# Patient Record
(demographics unavailable — no encounter records)

---

## 2018-11-03 NOTE — EDPHYS
Physician Documentation                                                                           

 [facility]                                                                

Name: Lesley Jones                                                                             

Age: 30 yrs                                                                                       

Sex: Female                                                                                       

: 1987                                                                                   

MRN: [id number]                                                                                   

Arrival Date: 2018                                                                          

Time: 05:23                                                                                       

Account#: [id number]                                                                            

Bed 17                                                                                            

Private MD: [facility]                                                                         

ED Physician Pedro Fay                                                                       

HPI:                                                                                              

                                                                                             

06:12 This 30 yrs old  Female presents to ER via Ambulatory with complaints of Chest rh1 

      Pain.                                                                                       

06:12 The patient or guardian reports chest pain that is located primarily in the anterior    rh1 

      aspect of left upper chest. The pain radiates to the left shoulder. Associated signs        

      and symptoms: Pertinent positives: cough, lightheadedness, near-syncope, shortness of       

      breath, Pertinent negatives: abdominal pain, diaphoresis, lower extremity pain, lower       

      extremity swelling, nausea, palpitations, recent travel, syncope. The chest pain is         

      described as sharp. Duration: The patient or guardian reports multiple episodes, that       

      are intermittent, that wax and wane, the episodes last approximately 1 second(s).           

      Modifying factors: The symptoms are alleviated by nothing. the symptoms are aggravated      

      by deep breath. Severity of pain: At its worst the pain was moderate in the emergency       

      department the pain has resolved. The patient has not experienced similar symptoms in       

      the past. The patient has not recently seen a physician. Reports left upper chest pain      

      that began while at work 1 hour ago, moving through left shoulder. + SOB with onset of      

      pain and continues at this time. Reports pain lasts for < 1 min at a time, and she          

      becomes lightheaded with episodes. Last episode approx. 10 min ago. Denies any              

      diaphoresis, N/V, syncope, leg pain/swelling, no recent surgery/travel..                    

                                                                                                  

OB/GYN:                                                                                           

05:37 LMP 10/18/2018                                                                          bb  

                                                                                                  

Historical:                                                                                       

- Allergies:                                                                                      

05:37 Sudafed (RAPID HEART RATE);                                                             bb  

05:37 Tramadol HCl (Unable to sleep);                                                         bb  

- Home Meds:                                                                                      

05:37 Adderall 10 mg Oral tab 1 tab once daily [Active]; levothyroxine 25 mcg oral tab 1 tab  bb  

      once daily [Active]; Prozac 40 mg Oral cap 1 cap 2 times per day [Active]; propranolol      

      20 mg Oral tab 1 tab 2 times per day [Active];                                              

- PMHx:                                                                                           

05:37 ADD/ADHD; Hypothyroidism; Essential tremors; Anxiety; Depression;                       bb  

- PSHx:                                                                                           

05:37 ; Cholecystectomy; Lap band;                                                   bb  

                                                                                                  

- Immunization history:: Adult Immunizations up to date.                                          

- Social history:: Smoking status: Patient uses tobacco products, smokes one-half pack            

  cigarettes per day, Patient/guardian denies using alcohol, street drugs.                        

- Ebola Screening: : No symptoms or risks identified at this time.                                

- Family history:: not pertinent.                                                                 

                                                                                                  

                                                                                                  

ROS:                                                                                              

06:12 Constitutional: Negative for fever, chills                                              rh1 

06:12 ENT: Positive for nasal discharge, Negative for difficulty swallowing, difficulty           

      handling secretions.                                                                        

06:12 Cardiovascular: Positive for chest pain, Negative for edema, palpitations.                  

06:12 Respiratory: Positive for cough, with no reported sputum, shortness of breath, Negative     

      for hemoptysis, sputum production, wheezing.                                                

06:12 Abdomen/GI: Negative for abdominal pain, nausea, vomiting, and diarrhea.                    

06:12 Back: Negative for decreased range of motion, pain at rest, pain with movement,             

      radiated pain.                                                                              

06:12 MS/extremity: Positive for pain, from left chest, Negative for decreased range of           

      motion, paresthesias.                                                                       

06:12 Skin: Negative for diaphoresis.                                                             

06:12 Neuro: Positive for dizziness, near syncope, Negative for altered mental status,            

      headache, numbness, syncope, tingling, weakness.                                            

06:12 All other systems are negative.                                                             

                                                                                                  

Exam:                                                                                             

06:12 Constitutional:  This is a well developed, well nourished patient who is awake, alert,  rh1 

      and in no acute distress. Head/Face:  Normocephalic, atraumatic. ENT:  Nares patent. No     

      nasal discharge, no septal abnormalities noted.  Tympanic membranes are normal and          

      external auditory canals are clear.  Oropharynx with no redness, swelling, or masses,       

      exudates, or evidence of obstruction, uvula midline.  Mucous membranes moist. Neck:         

      Trachea midline, and no cervical lymphadenopathy.  Supple, full range of motion without     

      nuchal rigidity.  No Meningismus. Cardiovascular:  Regular rate and rhythm with a           

      normal S1 and S2.  No gallops, murmurs, or rubs.  No JVD.  No pulse deficits.               

      Respiratory:  Lungs have equal breath sounds bilaterally, clear to auscultation.  No        

      rales, rhonchi or wheezes noted.  No increased work of breathing. Abdomen/GI:  Soft,        

      non-tender, with normal bowel sounds.  No distension.  No guarding or rebound.  No          

      evidence of tenderness throughout. Back:  No spinal tenderness.  No costovertebral          

      tenderness.  Full range of motion.                                                          

06:12 Skin:  Warm, dry with normal turgor.  Normal color with no rashes, no lesions, and no       

      evidence of cellulitis. MS/ Extremity:  Pulses equal, no cyanosis.  Neurovascular           

      intact.  Full, normal range of motion.                                                      

06:12 Cardiovascular: Edema: is not appreciated.                                                  

06:12 Musculoskeletal/extremity: DVT Exam: No signs of deep vein thrombosis. no pain, no          

      swelling, no tenderness, negative Homans' sign noted on exam, no appreciated bluish         

      discoloration, no erythema, no increased warmth, Calves: are non-tender, have equal         

      circumference.                                                                              

06:12 Neuro: Orientation: is normal, to person, place \T\ time. Mentation: is normal, lucid,      

      able to follow commands, Motor: is normal, moves all fours, Sensation: is normal, no        

      obvious gross deficits.                                                                     

                                                                                                  

Vital Signs:                                                                                      

05:37  / 87; Pulse 85; Resp 16 S; Temp 98.7(O); Pulse Ox 100% on R/A; Weight 97.52 kg   bb  

      (R); Height 5 ft. 2 in. (157.48 cm) (R); Pain 6/10;                                         

07:05  / 65; Pulse 79; Resp 19; Pulse Ox 100% on R/A; Pain 2/10;                        em  

08:00  / 72; Pulse 77; Resp 18; Pulse Ox 99% on R/A; Pain 2/10;                         em  

05:37 Body Mass Index 39.32 (97.52 kg, 157.48 cm)                                             bb  

                                                                                                  

MDM:                                                                                              

06:12 Patient medically screened.                                                             rh1 

06:40 ED course: PERC = 0.                                                                    rh1 

07:57 The patient's deep vein thrombosis risk score was calculated as follows: Total Score:   rh1 

      0. This patient was found to be at low risk for a deep vein thrombosis by using the         

      Well's assessment criteria. Data reviewed: vital signs, nurses notes, lab test              

      result(s), EKG, radiologic studies, plain films, and as a result, I will discharge          

      patient. Data interpreted: Pulse oximetry: on room air is 100 %. Interpretation:            

      normal. Counseling: I had a detailed discussion with the patient and/or guardian            

      regarding: the historical points, exam findings, and any diagnostic results supporting      

      the discharge/admit diagnosis, lab results, radiology results, the need for outpatient      

      follow up, a family practitioner, to return to the emergency department if symptoms         

      worsen or persist or if there are any questions or concerns that arise at home. ED          

      course: Without pain at this time, VSS, labs unremarkable.                                  

07:57 Special discussion: Based on the patient's history, exam, and Dx evaluation, there is   rh1 

      no indication for emergent intervention or inpatient Tx. It is understood by the            

      patient/guardian that if the Sx's persist or worsen they need to return immediately for     

      re-evaluation.                                                                              

                                                                                                  

                                                                                             

06:10 Order name: Urine Dipstick--Ancillary (enter results)                                   ms  

                                                                                             

06:10 Order name: Urine Pregnancy--Ancillary (enter results)                                  ms  

                                                                                             

06:33 Order name: Basic Metabolic Panel; Complete Time: 07:41                                 [facility] 

                                                                                             

06:33 Order name: CBC with Diff; Complete Time: 07:29                                         [facility] 

                                                                                             

06:33 Order name: LFT's; Complete Time: 07:41                                                 rh 

                                                                                             

06:33 Order name: Magnesium; Complete Time: 07:41                                             rh 

                                                                                             

06:33 Order name: NT PRO-BNP; Complete Time: 07:41                                            rh 

                                                                                             

06:33 Order name: PT-INR; Complete Time: 07:29                                                rh1 

                                                                                             

06:33 Order name: Troponin (emerg Dept Use Only); Complete Time: 07:41                        [facility] 

                                                                                             

06:33 Order name: XRAY Chest (1 view)                                                         [facility] 

                                                                                             

06:33 Order name: TSH; Complete Time: 07:41                                                   [facility] 

                                                                                             

06:33 Order name: Cardiac monitoring; Complete Time: 06:35                                    rh1 

                                                                                             

06:33 Order name: EKG - Nurse/Tech; Complete Time: 06:35                                      rh1 

                                                                                             

06:33 Order name: IV Saline Lock; Complete Time: 06:35                                        rh 

                                                                                             

06:33 Order name: Labs collected and sent; Complete Time: 06:35                               1 

                                                                                             

06:33 Order name: O2 Per Protocol; Complete Time: 06:35                                       [facility] 

                                                                                             

06:33 Order name: O2 Sat Monitoring; Complete Time: 06:35                                     rh1 

                                                                                                  

Administered Medications:                                                                         

No medications were administered                                                                  

                                                                                                  

                                                                                                  

Disposition:                                                                                      

08:19 Co-signature as Attending Physician, Pedro Fay MD I agree with the assessment and   kdr 

      plan of care.                                                                               

                                                                                                  

Disposition:                                                                                      

18 07:59 Discharged to Home. Impression: Chest pain on breathing.                           

- Condition is Stable.                                                                            

- Discharge Instructions: Nonspecific Chest Pain, Chest Wall Pain, Pleurisy.                      

                                                                                                  

- Medication Reconciliation Form, Thank You Letter, Antibiotic Education, Prescription            

  Opioid Use form.                                                                                

- Follow up: Lio Laguna DO; When: 1 - 2 days; Reason: Recheck today's complaints,             

  Continuance of care, Re-evaluation by your physician. Follow up: Emergency                      

  Department; When: As needed; Reason: Fever > 102 F, If symptoms return, Trouble                 

  breathing, Worsening of condition.                                                              

- Problem is new.                                                                                 

- Symptoms have improved.                                                                         

                                                                                                  

                                                                                                  

                                                                                                  

Signatures:                                                                                       

Dispatcher [employer]                           [facility]                                                 

Pedro Fay MD MD   kdr                                                  

Chapo Story, LVN                       LVN  em                                                   

Allie Lau, RN                     RN   Lenora Calles, NP                       NP   rh1                                                  

                                                                                                  

Corrections: (The following items were deleted from the chart)                                    

06:11 06:11 Urine Dipstick-Ancillary ordered. [facility]

06:11 06:11 Urine Pregnancy--Ancillary ordered. [facility]

08:14 07:59 2018 07:59 Discharged to Home. Impression: Chest pain on breathing.         em  

      Condition is Stable. Forms are Medication Reconciliation Form, Thank You Letter,            

      Antibiotic Education, Prescription Opioid Use. Follow up: Lio Laguna; When: 1 - 2         

      days; Reason: Recheck today's complaints, Continuance of care, Re-evaluation by your        

      physician. Follow up: Emergency Department; When: As needed; Reason: Fever > 102 F, If      

      symptoms return, Trouble breathing, Worsening of condition. Problem is new. Symptoms        

      have improved. rh1                                                                          

                                                                                                  

**************************************************************************************************

## 2018-11-03 NOTE — XMS REPORT
[facility]

 Created on:August 3, 2018



Patient:Lesley Jones

Sex:Female

:1987

External Reference #:[id number]





Demographics







 Address  [address] [place] [id number]

 

 Phone  [phone]

 

 Preferred Language  en

 

 Marital Status  Unknown

 

 Congregation Affiliation  Unknown

 

 Race  

 

 Ethnic Group  Unknown









Author







 Organization  [employer]









Care Team Providers







 Name  Role  Phone

 

 Jase Lio  Provider Role  Unavailable









Allergies

No Known Allergies



Problems







 Problem Type  Condition  Code  Onset Dates  Condition Status

 

 Problem  Hypothyroidism, unspecified type  E03.9    Active

 

 Problem  Seasonal allergic rhinitis,  J30.2    Active



   unspecified trigger      

 

 Assessment  Hypothyroidism, unspecified type  E03.9    Active

 

 Problem  Neck pain  M54.2    Active

 

 Problem  PTSD (post-traumatic stress  F43.10    Active



   disorder)      

 

 Problem  Otalgia of both ears  H92.03    Active

 

 Problem  Primary insomnia  F51.01    Active

 

 Problem  Gastroesophageal reflux disease  K21.9    Active



   without esophagitis      

 

 Problem  Depression with anxiety  F41.8    Active

 

 Problem  ADHD (attention deficit  F90.0    Active



   hyperactivity disorder), inattentive      



   type      







Medications







 Medication  Code  Code  Instructions  Start  End  Status  Dosage



   System      Date  Date    

 

 Levothyroxine  NDC  [id number]  25 MCG Orally      Active  1 tablet



 Sodium      Once a day        on an



               empty



               stomach in



               the



               morning







Results

No Known Results



Summary Purpose

[employer] Submission

## 2018-11-03 NOTE — XMS REPORT
[facility]

 Created on:2018



Patient:Lesley Campa

Sex:Female

:1987

External Reference #:[id number]





Demographics







 Address  [address] [place] [address]

 

 Phone  [phone]

 

 Preferred Language  en

 

 Marital Status  Unknown

 

 Judaism Affiliation  Unknown

 

 Race  

 

 Ethnic Group   or 









Author







 Organization  [employer]









Care Team Providers







 Name  Role  Phone

 

 Lio Laguna  Provider Role  Unavailable









Allergies, Adverse Reactions, Alerts







 Substance  Reaction  Event Type

 

 tramadol  more anxious/no sleep  Drug Allergy

 

 Topamax  mouth break out  Drug Allergy

 

 Sudafed  makes heart race  Drug Allergy







Problems







 Problem Type  Condition  Code  Onset Dates  Condition Status

 

 Assessment  ADHD (attention deficit  F90.0    Active



   hyperactivity disorder), inattentive      



   type      

 

 Problem  Hypothyroidism, unspecified type  E03.9    Active

 

 Problem  Seasonal allergic rhinitis,  J30.2    Active



   unspecified trigger      

 

 Problem  Neck pain  M54.2    Active

 

 Problem  PTSD (post-traumatic stress  F43.10    Active



   disorder)      

 

 Problem  Otalgia of both ears  H92.03    Active

 

 Problem  Primary insomnia  F51.01    Active

 

 Problem  Gastroesophageal reflux disease  K21.9    Active



   without esophagitis      

 

 Problem  Depression with anxiety  F41.8    Active

 

 Problem  ADHD (attention deficit  F90.0    Active



   hyperactivity disorder), inattentive      



   type      

 

 Assessment  Otalgia of both ears  H92.03    Active

 

 Assessment  PTSD (post-traumatic stress  F43.10    Active



   disorder)      

 

 Assessment  Depression with anxiety  F41.8    Active

 

 Assessment  Neck pain  M54.2    Active

 

 Assessment  Hypothyroidism, unspecified type  E03.9    Active

 

 Assessment  Seasonal allergic rhinitis,  J30.2    Active



   unspecified trigger      

 

 Assessment  Primary insomnia  F51.01    Active







Medications







 Medication  Code  Code  Instructions  Start  End  Status  Dosage



   System      Date  Date    

 

 Prozac  NDC  29963253193  40 MG Orally      Active  1 capsule



       Twice a day        in the



               morning

 

 Levothyroxine  NDC  [id number]  25 MCG Orally      Active  1 tablet



 Sodium      Once a day        on an



               empty



               stomach in



               the



               morning

 

 Amoxicillin  NDC  82914410062  500 MG Orally    Active  1 capsule



       every 8 hrs  2018    

 

 Ambien  NDC  34861691236  10 MG Orally      Active  1 tablet



       Once a day        at bedtime



               as needed

 

 Adderall  NDC  45810924165  10 MG Orally      Active  1 tablet



       Twice a day        in the



               morning

 

 Zantac  NDC  74147556302  150 MG Orally      Active  1 tablet



       Once a day        at bedtime

 

 Duexis  NDC  08944477301  800-26.6 MG      Active  1 tablet



       Orally Three        



       times a day PRN        



       pain        







Results

No Known Results



Summary Purpose

eClinicalWorks Submission

## 2018-11-03 NOTE — XMS REPORT
[facility]

 Created on:April 10, 2018



Patient:Lesley Campa

Sex:Female

:1987

External Reference #:[id number]





Demographics







 Address  [address] [place] [address]

 

 Phone  [phone]

 

 Preferred Language  en

 

 Marital Status  Unknown

 

 Gnosticism Affiliation  Unknown

 

 Race  

 

 Ethnic Group   or 









Author







 Organization  [employer]









Care Team Providers







 Name  Role  Phone

 

 Lio Laguna  Provider Role  Unavailable









Allergies

No Known Allergies



Problems







 Problem Type  Condition  Code  Onset Dates  Condition Status

 

 Problem  Seasonal allergic rhinitis,  J30.2    Active



   unspecified trigger      

 

 Problem  PTSD (post-traumatic stress  F43.10    Active



   disorder)      

 

 Problem  Depression with anxiety  F41.8    Active

 

 Problem  Neck pain  M54.2    Active

 

 Problem  Gastroesophageal reflux disease  K21.9    Active



   without esophagitis      

 

 Problem  Hypothyroidism, unspecified type  E03.9    Active

 

 Problem  ADHD (attention deficit  F90.0    Active



   hyperactivity disorder), inattentive      



   type      

 

 Problem  Primary insomnia  F51.01    Active







Medications

No Known Medications



Results

No Known Results



Summary Purpose

[employer] Submission

## 2018-11-03 NOTE — XMS REPORT
[facility]

 Created on:2018



Patient:Lesley Jones

Sex:Female

:1987

External Reference #:[id number]





Demographics







 Address  [address] [place] [address]

 

 Phone  [phone]

 

 Preferred Language  en

 

 Marital Status  Unknown

 

 Catholic Affiliation  Unknown

 

 Race  

 

 Ethnic Group  Unknown









Author







 Organization  [employer]









Care Team Providers







 Name  Role  Phone

 

 Lio Laguna  Provider Role  Unavailable









Allergies

No Known Allergies



Problems







 Problem Type  Condition  Code  Onset Dates  Condition Status

 

 Problem  Hypothyroidism, unspecified type  E03.9    Active

 

 Problem  Seasonal allergic rhinitis,  J30.2    Active



   unspecified trigger      

 

 Problem  Neck pain  M54.2    Active

 

 Problem  PTSD (post-traumatic stress  F43.10    Active



   disorder)      

 

 Problem  Otalgia of both ears  H92.03    Active

 

 Problem  Primary insomnia  F51.01    Active

 

 Problem  Gastroesophageal reflux disease  K21.9    Active



   without esophagitis      

 

 Problem  Depression with anxiety  F41.8    Active

 

 Problem  ADHD (attention deficit  F90.0    Active



   hyperactivity disorder), inattentive      



   type      

 

 Assessment  Otalgia of both ears  H92.03    Active

 

 Assessment  Neck pain  M54.2    Active

 

 Assessment  Depression with anxiety  F41.8    Active

 

 Assessment  Hypothyroidism, unspecified type  E03.9    Active

 

 Assessment  Seasonal allergic rhinitis,  J30.2    Active



   unspecified trigger      

 

 Assessment  Primary insomnia  F51.01    Active

 

 Assessment  PTSD (post-traumatic stress  F43.10    Active



   disorder)      

 

 Assessment  ADHD (attention deficit  F90.0    Active



   hyperactivity disorder), inattentive      



   type      







Medications







 Medication  Code  Code  Instructions  Start  End  Status  Dosage



   System      Date  Date    

 

 Belsomra  NDC  32503006923  10 MG Orally  ,    Active  1 tablet



       Once a day        at



               bedtime



               as needed

 

 Adderall  NDC  12414175057  10 MG Orally      Active  1 tablet



       Twice a day        in the



               morning

 

 Zantac  NDC  19262440492  150 MG Orally      Active  1 tablet



       Once a day        at



               bedtime

 

 Prozac  NDC  20067076842  40 MG Orally      Active  1 capsule



       Twice a day        in the



               morning

 

 Levothyroxine  NDC  40509581483  25 MCG Orally      Active  1 tablet



 Sodium      Once a day        on an



               empty



               stomach



               in the



               morning

 

 Duexis  NDC  98383967420  800-26.6 MG      Active  1 tablet



       Orally Three        



       times a day PRN        



       pain        

 

 Ambien  NDC  21628934444  10 MG Orally      Inactive  1 tablet



       Once a day        at



               bedtime



               as needed







Results

No Known Results



Summary Purpose

[employer]ClinicalWorks Submission

## 2018-11-03 NOTE — XMS REPORT
[facility]

 Created on:2018



Patient:Lesley Campa

Sex:Female

:1987

External Reference #:[id number]





Demographics







 Address  [address] [place] [address]

 

 Phone  [phone]

 

 Preferred Language  en

 

 Marital Status  Unknown

 

 Pentecostalism Affiliation  Unknown

 

 Race  

 

 Ethnic Group   or 









Author







 Organization  [employer]









Care Team Providers







 Name  Role  Phone

 

 Lio Laguna  Provider Role  Unavailable









Allergies

No Known Allergies



Problems







 Problem Type  Condition  Code  Onset Dates  Condition Status

 

 Problem  Seasonal allergic rhinitis,  J30.2    Active



   unspecified trigger      

 

 Problem  PTSD (post-traumatic stress  F43.10    Active



   disorder)      

 

 Problem  Depression with anxiety  F41.8    Active

 

 Problem  Neck pain  M54.2    Active

 

 Problem  Gastroesophageal reflux disease  K21.9    Active



   without esophagitis      

 

 Problem  Hypothyroidism, unspecified type  E03.9    Active

 

 Problem  ADHD (attention deficit  F90.0    Active



   hyperactivity disorder), inattentive      



   type      

 

 Problem  Primary insomnia  F51.01    Active







Medications

No Known Medications



Results

No Known Results



Summary Purpose

[employer] Submission

## 2018-11-03 NOTE — XMS REPORT
[facility]

 Created on:August 15, 2018



Patient:Lesley Jones

Sex:Female

:1987

External Reference #:[id number]





Demographics







 Address  [address] [place] [address]

 

 Phone  [phone]

 

 Preferred Language  en

 

 Marital Status  Unknown

 

 Synagogue Affiliation  Unknown

 

 Race  

 

 Ethnic Group  Unknown









Author







 Organization  [employer]









Care Team Providers







 Name  Role  Phone

 

 Jase Lio  Provider Role  Unavailable









Allergies

No Known Allergies



Problems







 Problem Type  Condition  Code  Onset Dates  Condition Status

 

 Problem  Hypothyroidism, unspecified type  E03.9    Active

 

 Problem  Seasonal allergic rhinitis,  J30.2    Active



   unspecified trigger      

 

 Problem  Neck pain  M54.2    Active

 

 Problem  PTSD (post-traumatic stress  F43.10    Active



   disorder)      

 

 Problem  Otalgia of both ears  H92.03    Active

 

 Problem  Primary insomnia  F51.01    Active

 

 Problem  Gastroesophageal reflux disease  K21.9    Active



   without esophagitis      

 

 Problem  Depression with anxiety  F41.8    Active

 

 Problem  ADHD (attention deficit  F90.0    Active



   hyperactivity disorder), inattentive      



   type      







Medications

No Known Medications



Results

No Known Results



Summary Purpose

[employer]ClinicalWorks Submission

## 2018-11-03 NOTE — XMS REPORT
[facility]

 Created on:2018



Patient:Lesley Campa

Sex:Female

:1987

External Reference #:[id number]





Demographics







 Address  [address] [place] [address]

 

 Phone  [phone]

 

 Preferred Language  en

 

 Marital Status  Unknown

 

 Anglican Affiliation  Unknown

 

 Race  

 

 Ethnic Group  Unknown









Author







 Organization  [employer]









Care Team Providers







 Name  Role  Phone

 

 Malia Navarrete  Provider Role  Unavailable









Allergies

No Known Allergies



Problems







 Problem Type  Condition  Code  Onset Dates  Condition Status

 

 Problem  Hypothyroidism, unspecified type  E03.9    Active

 

 Problem  Seasonal allergic rhinitis,  J30.2    Active



   unspecified trigger      

 

 Problem  Neck pain  M54.2    Active

 

 Problem  PTSD (post-traumatic stress  F43.10    Active



   disorder)      

 

 Problem  Otalgia of both ears  H92.03    Active

 

 Problem  Primary insomnia  F51.01    Active

 

 Problem  Gastroesophageal reflux disease  K21.9    Active



   without esophagitis      

 

 Problem  Depression with anxiety  F41.8    Active

 

 Problem  ADHD (attention deficit  F90.0    Active



   hyperactivity disorder), inattentive      



   type      







Medications

No Known Medications



Results

No Known Results



Summary Purpose

[employer]ClinicalWorks Submission

## 2018-11-03 NOTE — XMS REPORT
[facility]

 Created on:2018



Patient:Lesley Jones

Sex:Female

:1987

External Reference #:[id number]





Demographics







 Address  [address] [place] [address]

 

 Phone  [phone]

 

 Preferred Language  en

 

 Marital Status  Unknown

 

 Zoroastrianism Affiliation  Unknown

 

 Race  

 

 Ethnic Group  Unknown









Author







 Organization  [employer]









Care Team Providers







 Name  Role  Phone

 

 Jase Lio  Provider Role  Unavailable









Allergies, Adverse Reactions, Alerts







 Substance  Reaction  Event Type

 

 tramadol  more anxious/no sleep  Drug Allergy

 

 Topamax  mouth break out  Drug Allergy

 

 Sudafed  makes heart race  Drug Allergy







Problems







 Problem Type  Condition  Code  Onset Dates  Condition Status

 

 Problem  Primary insomnia  F51.01    Active

 

 Problem  Hypothyroidism, unspecified type  E03.9    Active

 

 Problem  Seasonal allergic rhinitis,  J30.2    Active



   unspecified trigger      

 

 Problem  Otalgia of both ears  H92.03    Active

 

 Problem  Neck pain  M54.2    Active

 

 Problem  Essential tremor  G25.0    Active

 

 Problem  ADHD (attention deficit  F90.0    Active



   hyperactivity disorder), inattentive      



   type      

 

 Problem  Gastroesophageal reflux disease  K21.9    Active



   without esophagitis      

 

 Problem  PTSD (post-traumatic stress  F43.10    Active



   disorder)      

 

 Problem  Depression with anxiety  F41.8    Active

 

 Assessment  Seasonal allergic rhinitis,  J30.2    Active



   unspecified trigger      

 

 Assessment  PTSD (post-traumatic stress  F43.10    Active



   disorder)      

 

 Assessment  Neck pain  M54.2    Active

 

 Assessment  Essential tremor  G25.0    Active

 

 Assessment  Primary insomnia  F51.01    Active

 

 Assessment  Depression with anxiety  F41.8    Active

 

 Assessment  ADHD (attention deficit  F90.0    Active



   hyperactivity disorder), inattentive      



   type      

 

 Assessment  Hypothyroidism, unspecified type  E03.9    Active







Medications







 Medication  Code  Code  Instructions  Start  End  Status  Dosage



   System      Date  Date    

 

 Levothyroxine  NDC  44612929262  25 MCG Orally      Active  1 tablet



 Sodium      Once a day        on an



               empty



               stomach in



               the



               morning

 

 Duexis  NDC  13960255722  800-26.6 MG      Active  1 tablet



       Orally Three        



       times a day PRN        



       pain        

 

 Prozac  NDC  42714893395  40 MG Orally      Active  1 capsule



       Twice a day        in the



               morning

 

 Propranolol HCl  NDC  79996988226  20 MG Orally  Aug 16,    Active  1 tablet



       Twice a day        on an



               empty



               stomach

 

 Belsomra  NDC  02513129092  10 MG Orally      Active  1 tablet



       Once a day        at bedtime



               as needed

 

 Adderall  NDC  27228297619  10 MG Orally      Active  1 tablet



       Twice a day        in the



               morning

 

 Zantac  NDC  65140762749  150 MG Orally      Active  1 tablet



       Once a day        at bedtime







Results

No Known Results



Summary Purpose

[employer]ClinicalWorks Submission

## 2018-11-03 NOTE — XMS REPORT
[facility]

 Created on:2018



Patient:Lesley Campa

Sex:Female

:1987

External Reference #:[id number]





Demographics







 Address  [address] [place] [address]

 

 Phone  [phone]

 

 Preferred Language  en

 

 Marital Status  Unknown

 

 [employer] Affiliation  Unknown

 

 Race  

 

 Ethnic Group  Unknown









Author







 Organization  [employer]









Care Team Providers







 Name  Role  Phone

 

 Malia Navarrete  Provider Role  Unavailable









Allergies, Adverse Reactions, Alerts







 Substance  Reaction  Event Type

 

 tramadol  more anxious/no sleep  Drug Allergy

 

 Topamax  mouth break out  Drug Allergy

 

 Sudafed  makes heart race  Drug Allergy







Problems







 Problem Type  Condition  Code  Onset Dates  Condition Status

 

 Problem  Hypothyroidism, unspecified type  E03.9    Active

 

 Problem  Seasonal allergic rhinitis,  J30.2    Active



   unspecified trigger      

 

 Assessment  Women's annual routine  Z01.419    Active



   gynecological examination      

 

 Assessment  Generalized abdominal pain  R10.84    Active

 

 Assessment  Vagina itching  N89.8    Active

 

 Problem  Neck pain  M54.2    Active

 

 Problem  PTSD (post-traumatic stress  F43.10    Active



   disorder)      

 

 Problem  Otalgia of both ears  H92.03    Active

 

 Problem  Primary insomnia  F51.01    Active

 

 Problem  Gastroesophageal reflux disease  K21.9    Active



   without esophagitis      

 

 Problem  Depression with anxiety  F41.8    Active

 

 Problem  ADHD (attention deficit  F90.0    Active



   hyperactivity disorder),      



   inattentive type      







Medications







 Medication  Code  Code  Instructions  Start  End  Status  Dosage



   System      Date  Date    

 

 Duexis  NDC  85233755972  800-26.6 MG      Active  1 tablet



       Orally Three        



       times a day PRN        



       pain        

 

 Prozac  NDC  10101266848  40 MG Orally      Active  1 capsule



       Twice a day        in the



               morning

 

 Ambien  NDC  38622341122  10 MG Orally      Active  1 tablet



       Once a day        at bedtime



               as needed

 

 Adderall  NDC  58548688282  10 MG Orally      Active  1 tablet



       Twice a day        in the



               morning

 

 Levothyroxine  NDC  49597153990  25 MCG Orally      Active  1 tablet



 Sodium      Once a day        on an



               empty



               stomach in



               the



               morning

 

 Zantac  NDC  81034895535  150 MG Orally      Active  1 tablet



       Once a day        at bedtime







Results







 Name  Result  Date  Reference Range  Unit  Abnormality Flag

 

 URINALYSIS AUTO W/O SCOPE          



 (23835)          

 

 ----NIT  neg  2018      

 

 ----URO  0.2  2018      

 

 ----PROTEIN  neg  2018      

 

 ----pH  6.0  2018      

 

 ----BLO  2+  2018      

 

 ----GLUCOSE  neg  2018      

 

 ----WILLI  neg  2018      

 

 ----BILIRUBIN  neg  2018      

 

 ----KETONES  neg  2018      

 

 ----SPECIFIC GRAVITY  1.030  2018      







Summary Purpose

[employer]ClinicalWorks Submission

## 2018-11-03 NOTE — XMS REPORT
[facility]

 Created on:May 4, 2018



Patient:Lesley Campa

Sex:Female

:1987

External Reference #:[id number]





Demographics







 Address  [address] [place] [address]

 

 Phone  [phone]

 

 Preferred Language  en

 

 Marital Status  Unknown

 

 Judaism Affiliation  Unknown

 

 Race  

 

 Ethnic Group   or 









Author







 Organization  [employer]









Care Team Providers







 Name  Role  Phone

 

 Lio Laguna  Provider Role  Unavailable









Allergies

No Known Allergies



Problems







 Problem Type  Condition  Code  Onset Dates  Condition Status

 

 Problem  Seasonal allergic rhinitis,  J30.2    Active



   unspecified trigger      

 

 Assessment  Depression with anxiety  F41.8    Active

 

 Problem  PTSD (post-traumatic stress  F43.10    Active



   disorder)      

 

 Problem  Depression with anxiety  F41.8    Active

 

 Problem  Neck pain  M54.2    Active

 

 Problem  Gastroesophageal reflux disease  K21.9    Active



   without esophagitis      

 

 Problem  Hypothyroidism, unspecified type  E03.9    Active

 

 Problem  ADHD (attention deficit  F90.0    Active



   hyperactivity disorder), inattentive      



   type      

 

 Problem  Primary insomnia  F51.01    Active







Medications







 Medication  Code System  Code  Instructions  Start  End Date  Status  Dosage



         Date      

 

 Prozac  NDC  27687607901  40 MG Orally      Active  1 capsule



       Once a day        in the



               morning







Results

No Known Results



Summary Purpose

[employer] Submission

## 2018-11-03 NOTE — XMS REPORT
[facility]

 Created on:May 29, 2018



Patient:Lesley Campa

Sex:Female

:1987

External Reference #:[id number]





Demographics







 Address  [address] [place] [address]

 

 Phone  [phone]

 

 Preferred Language  en

 

 Marital Status  Unknown

 

 Rastafarian Affiliation  Unknown

 

 Race  

 

 Ethnic Group   or 









Author







 Organization  [employer]









Care Team Providers







 Name  Role  Phone

 

 Lio Laguna  Provider Role  Unavailable









Allergies

No Known Allergies



Problems







 Problem Type  Condition  Code  Onset Dates  Condition Status

 

 Problem  Seasonal allergic rhinitis,  J30.2    Active



   unspecified trigger      

 

 Problem  PTSD (post-traumatic stress  F43.10    Active



   disorder)      

 

 Problem  Depression with anxiety  F41.8    Active

 

 Problem  Neck pain  M54.2    Active

 

 Problem  Gastroesophageal reflux disease  K21.9    Active



   without esophagitis      

 

 Problem  Hypothyroidism, unspecified type  E03.9    Active

 

 Problem  ADHD (attention deficit  F90.0    Active



   hyperactivity disorder), inattentive      



   type      

 

 Problem  Primary insomnia  F51.01    Active







Medications

No Known Medications



Results

No Known Results



Summary Purpose

[employer] Submission

## 2018-11-03 NOTE — ER
Nurse's Notes                                                                                     

 [facility]                                                                

Name: Lesley Jones                                                                             

Age: 30 yrs                                                                                       

Sex: Female                                                                                       

: 1987                                                                                   

MRN: [id number]                                                                                   

Arrival Date: 2018                                                                          

Time: 05:23                                                                                       

Account#: [id number]                                                                            

Bed 17                                                                                            

Private MD: Lio Laguna                                                                         

Diagnosis: Chest pain on breathing                                                                

                                                                                                  

Presentation:                                                                                     

                                                                                             

05:32 Presenting complaint: Patient states: she was at work this morning and started having   bb  

      chest pain which is a constant dull pain and intermittent sharp pain which is also          

      radiating to her left elbow. Transition of care: patient was not received from another      

      setting of care. Onset of symptoms was 2018. Risk Assessment: Do you want      

      to hurt yourself or someone else? Patient reports no desire to harm self or others.         

      Initial Sepsis Screen: Does the patient meet any 2 criteria? No. Patient's initial          

      sepsis screen is negative. Does the patient have a suspected source of infection? No.       

      Patient's initial sepsis screen is negative. Care prior to arrival: None.                   

05:32 Method Of Arrival: Ambulatory                                                           bb  

05:32 Acuity: KAYLA 3                                                                           bb  

                                                                                                  

Triage Assessment:                                                                                

05:41 General: Appears in no apparent distress. Pain: Complains of pain in chest Pain         wh  

      radiates to left arm Pain currently is 3 out of 10 on a pain scale. at worst was 8 out      

      of 10 on a pain scale. Quality of pain is described as sharp. Cardiovascular: Reports       

      chest pain.                                                                                 

                                                                                                  

OB/GYN:                                                                                           

05:37 LMP 10/18/2018                                                                          bb  

                                                                                                  

Historical:                                                                                       

- Allergies:                                                                                      

05:37 Sudafed (RAPID HEART RATE);                                                             bb  

05:37 Tramadol HCl (Unable to sleep);                                                         bb  

- Home Meds:                                                                                      

05:37 Adderall 10 mg Oral tab 1 tab once daily [Active]; levothyroxine 25 mcg oral tab 1 tab  bb  

      once daily [Active]; Prozac 40 mg Oral cap 1 cap 2 times per day [Active]; propranolol      

      20 mg Oral tab 1 tab 2 times per day [Active];                                              

- PMHx:                                                                                           

05:37 ADD/ADHD; Hypothyroidism; Essential tremors; Anxiety; Depression;                       bb  

- PSHx:                                                                                           

05:37 ; Cholecystectomy; Lap band;                                                   bb  

                                                                                                  

- Immunization history:: Adult Immunizations up to date.                                          

- Social history:: Smoking status: Patient uses tobacco products, smokes one-half pack            

  cigarettes per day, Patient/guardian denies using alcohol, street drugs.                        

- Ebola Screening: : No symptoms or risks identified at this time.                                

- Family history:: not pertinent.                                                                 

                                                                                                  

                                                                                                  

Screenin:41 Abuse screen: Denies threats or abuse. Denies injuries from another. Nutritional        wh  

      screening: No deficits noted. Tuberculosis screening: No symptoms or risk factors           

      identified. Fall Risk None identified.                                                      

                                                                                                  

Assessment:                                                                                       

05:45 Pain: Pain began 2 hours ago.                                                           wh  

05:45 General: Appears in no apparent distress. Behavior is calm, cooperative, appropriate    wh  

      for age. Pain: Complains of pain in chest Pain radiates to left arm Pain currently is 3     

      out of 10 on a pain scale. at worst was 8 out of 10 on a pain scale. Quality of pain is     

      described as sharp, Pain began. Neuro: Level of Consciousness is awake, alert, obeys        

      commands,  are equal bilaterally. Cardiovascular: Heart tones S1 S2 Capillary          

      refill < 3 seconds Rhythm is sinus rhythm. Respiratory: Airway is patent Respiratory        

      effort is even, unlabored, Respiratory pattern is regular, symmetrical, Breath sounds       

      are clear bilaterally. GI: Abdomen is flat, non-distended. : No signs and/or symptoms     

      were reported regarding the genitourinary system. EENT: No signs and/or symptoms were       

      reported regarding the EENT system. Derm: Skin is intact, is healthy with good turgor,      

      Skin is pink, warm \T\ dry. normal. Musculoskeletal: Range of motion: intact in all         

      extremities.                                                                                

07:05 Reassessment: Patient appears in no apparent distress at this time. Patient and/or      em  

      family updated on plan of care and expected duration. Pain level reassessed. Patient is     

      alert, oriented x 3, equal unlabored respirations, skin warm/dry/pink. Patient states       

      symptoms have improved.                                                                     

08:00 Reassessment: Patient appears in no apparent distress at this time. Patient and/or      em  

      family updated on plan of care and expected duration. Pain level reassessed. Patient is     

      alert, oriented x 3, equal unlabored respirations, skin warm/dry/pink.                      

                                                                                                  

Vital Signs:                                                                                      

05:37  / 87; Pulse 85; Resp 16 S; Temp 98.7(O); Pulse Ox 100% on R/A; Weight 97.52 kg   bb  

      (R); Height 5 ft. 2 in. (157.48 cm) (R); Pain 6/10;                                         

07:05  / 65; Pulse 79; Resp 19; Pulse Ox 100% on R/A; Pain 2/10;                        em  

08:00  / 72; Pulse 77; Resp 18; Pulse Ox 99% on R/A; Pain 2/10;                         em  

05:37 Body Mass Index 39.32 (97.52 kg, 157.48 cm)                                             bb  

                                                                                                  

ED Course:                                                                                        

05:23 Patient arrived in ED.                                                                  es  

05:24 Lio Laguna DO is Private Physician.                                                 es  

05:34 Triage completed.                                                                       bb  

05:35 Adela Luo is Primary Nurse.                                                         wh  

05:37 Arm band placed on Patient placed in an exam room, on a stretcher, on pulse oximetry.   bb  

      EKG completed in triage. Results shown to MD.                                               

05:43 Patient has correct armband on for positive identification. Placed in gown. Bed in low  wh  

      position. Call light in reach. Side rails up X 1. Cardiac monitor on. Pulse ox on. NIBP     

      on.                                                                                         

05:45 Patient maintains SpO2 saturation greater than 95% on room air.                           

06:12 Lenora Nolasco NP is PHCP.                                                              rh1 

06:12 Pedro Fay MD is Attending Physician.                                              rh1 

07:09 Initial lab(s) drawn, by me, sent to lab. Inserted saline lock: 22 gauge in right       em  

      antecubital area, using aseptic technique. Blood collected.                                 

07:14 XRAY Chest (1 view) In Process Unspecified.                                             EDMS

07:58 Lio Laguna DO is Referral Physician.                                                rh1 

08:04 No provider procedures requiring assistance completed. IV discontinued, intact,         em  

      bleeding controlled, No redness/swelling at site. Pressure dressing applied.                

                                                                                                  

Administered Medications:                                                                         

No medications were administered                                                                  

                                                                                                  

                                                                                                  

Outcome:                                                                                          

07:59 Discharge ordered by MD.                                                                rh1 

08:04 Discharged to home ambulatory.                                                          em  

08:04 Condition: good                                                                             

08:04 Discharge instructions given to patient, Instructed on discharge instructions, follow       

      up and referral plans. Demonstrated understanding of instructions, follow-up care.          

08:14 Patient left the ED.                                                                    em  

                                                                                                  

Signatures:                                                                                       

Dispatcher [employer]                           Rowan Jones Edgar, PATRICIAN                       LVN  Allie Kirkland, RN                     RN   bb                                                   

Lenora Nolacso NP                       NP   rh1                                                  

Adela Luo                                                                                   

                                                                                                  

Corrections: (The following items were deleted from the chart)                                    

05:45 05:41 Pain: Complains of pain in chest Pain does not radiate. Pain currently is 3 out   wh  

      of 10 on a pain scale. at worst was 8 out of 10 on a pain scale. Quality of pain is         

      described as sharp, wh                                                                      

                                                                                                  

**************************************************************************************************

## 2018-11-03 NOTE — XMS REPORT
[facility]

 Created on:2018



Patient:Lesley Campa

Sex:Female

:1987

External Reference #:[id number]





Demographics







 Address  [address] [place] [address]

 

 Phone  [phone]

 

 Preferred Language  en

 

 Marital Status  Unknown

 

 Mu-ism Affiliation  Unknown

 

 Race  

 

 Ethnic Group   or 









Author







 Organization  [employer]









Care Team Providers







 Name  Role  Phone

 

 Lio Laguan  Provider Role  Unavailable









Allergies

No Known Allergies



Problems







 Problem Type  Condition  Code  Onset Dates  Condition Status

 

 Assessment  Neck pain  M54.2    Active

 

 Problem  Seasonal allergic rhinitis,  J30.2    Active



   unspecified trigger      

 

 Assessment  Hypothyroidism, unspecified type  E03.9    Active

 

 Problem  PTSD (post-traumatic stress  F43.10    Active



   disorder)      

 

 Problem  Depression with anxiety  F41.8    Active

 

 Problem  Neck pain  M54.2    Active

 

 Problem  Gastroesophageal reflux disease  K21.9    Active



   without esophagitis      

 

 Problem  Hypothyroidism, unspecified type  E03.9    Active

 

 Problem  ADHD (attention deficit  F90.0    Active



   hyperactivity disorder), inattentive      



   type      

 

 Problem  Primary insomnia  F51.01    Active







Medications

No Known Medications



Results

No Known Results



Summary Purpose

[employer] Submission

## 2018-11-03 NOTE — XMS REPORT
[facility]

 Created on:2018



Patient:Lesley Campa

Sex:Female

:1987

External Reference #:[id number]





Demographics







 Address  [address] [place] [address]

 

 Phone  [phone]

 

 Preferred Language  en

 

 Marital Status  Unknown

 

 Moravian Affiliation  Unknown

 

 Race  

 

 Ethnic Group  Unknown









Author







 Organization  [employer]









Care Team Providers







 Name  Role  Phone

 

 Malia Navarrete  Provider Role  Unavailable









Allergies

No Known Allergies



Problems







 Problem Type  Condition  Code  Onset Dates  Condition Status

 

 Problem  Hypothyroidism, unspecified type  E03.9    Active

 

 Problem  Seasonal allergic rhinitis,  J30.2    Active



   unspecified trigger      

 

 Problem  Neck pain  M54.2    Active

 

 Problem  PTSD (post-traumatic stress  F43.10    Active



   disorder)      

 

 Problem  Otalgia of both ears  H92.03    Active

 

 Problem  Primary insomnia  F51.01    Active

 

 Problem  Gastroesophageal reflux disease  K21.9    Active



   without esophagitis      

 

 Problem  Depression with anxiety  F41.8    Active

 

 Problem  ADHD (attention deficit  F90.0    Active



   hyperactivity disorder), inattentive      



   type      







Medications







 Medication  Code System  Code  Instructions  Start Date  End Date  Status  
Dosage

 

 Flagyl  NDC  17921924705  500 MG Orally  ,  ,  Active  1 tablet



       Twice daily  2018    







Results

No Known Results



Summary Purpose

[employer] Submission

## 2018-11-03 NOTE — XMS REPORT
[facility]

 Created on:2018



Patient:Lesley Jones

Sex:Female

:1987

External Reference #:[id number]





Demographics







 Address  [address] [place] [address]

 

 Phone  [phone]

 

 Preferred Language  en

 

 Marital Status  Unknown

 

 Shinto Affiliation  Unknown

 

 Race  

 

 Ethnic Group  Unknown









Author







 Organization  [employer]









Care Team Providers







 Name  Role  Phone

 

 Jase Lio  Provider Role  Unavailable









Allergies

No Known Allergies



Problems







 Problem Type  Condition  Code  Onset Dates  Condition Status

 

 Problem  Hypothyroidism, unspecified type  E03.9    Active

 

 Problem  Seasonal allergic rhinitis,  J30.2    Active



   unspecified trigger      

 

 Problem  Neck pain  M54.2    Active

 

 Problem  PTSD (post-traumatic stress  F43.10    Active



   disorder)      

 

 Problem  Otalgia of both ears  H92.03    Active

 

 Problem  Primary insomnia  F51.01    Active

 

 Problem  Gastroesophageal reflux disease  K21.9    Active



   without esophagitis      

 

 Problem  Depression with anxiety  F41.8    Active

 

 Problem  ADHD (attention deficit  F90.0    Active



   hyperactivity disorder), inattentive      



   type      







Medications

No Known Medications



Results

No Known Results



Summary Purpose

[employer] Submission

## 2018-11-03 NOTE — XMS REPORT
[facility]

 Created on:May 23, 2018



Patient:Lesley Campa

Sex:Female

:1987

External Reference #:[id number]





Demographics







 Address  [address] [place] [address]

 

 Phone  [phone]

 

 Preferred Language  en

 

 Marital Status  Unknown

 

 Scientologist Affiliation  Unknown

 

 Race  

 

 Ethnic Group   or 









Author







 Organization  [employer]









Care Team Providers







 Name  Role  Phone

 

 Lio Laguna  Provider Role  Unavailable









Allergies, Adverse Reactions, Alerts







 Substance  Reaction  Event Type

 

 tramadol  more anxious/no sleep  Drug Allergy

 

 Topamax  mouth break out  Drug Allergy

 

 Sudafed  makes heart race  Drug Allergy







Problems







 Problem Type  Condition  Code  Onset Dates  Condition Status

 

 Assessment  Primary insomnia  F51.01    Active

 

 Problem  Seasonal allergic rhinitis,  J30.2    Active



   unspecified trigger      

 

 Assessment  ADHD (attention deficit  F90.0    Active



   hyperactivity disorder), inattentive      



   type      

 

 Problem  PTSD (post-traumatic stress  F43.10    Active



   disorder)      

 

 Problem  Depression with anxiety  F41.8    Active

 

 Problem  Neck pain  M54.2    Active

 

 Problem  Gastroesophageal reflux disease  K21.9    Active



   without esophagitis      

 

 Problem  Hypothyroidism, unspecified type  E03.9    Active

 

 Problem  ADHD (attention deficit  F90.0    Active



   hyperactivity disorder), inattentive      



   type      

 

 Problem  Primary insomnia  F51.01    Active

 

 Assessment  Seasonal allergic rhinitis,  J30.2    Active



   unspecified trigger      

 

 Assessment  PTSD (post-traumatic stress  F43.10    Active



   disorder)      

 

 Assessment  Depression with anxiety  F41.8    Active

 

 Assessment  Neck pain  M54.2    Active

 

 Assessment  Hypothyroidism, unspecified type  E03.9    Active







Medications







 Medication  Code  Code  Instructions  Start  End  Status  Dosage



   System      Date  Date    

 

 Prozac  NDC  [id number]  40 MG Orally      Active  1 capsule



       Once a day        in the



               morning

 

 Duexis  NDC  [id number]  800-26.6 MG  May 22,  July  Active  1 tablet



       Orally Three    21,    



       times a day PRN        



       pain        

 

 Ambien  NDC  [id number]  10 MG Orally      Active  1 tablet



       Once a day        at bedtime



               as needed

 

 Adderall  NDC  [id number]  10 MG Orally      Active  1 tablet



       Twice a day        in the



               morning

 

 Levothyroxine  NDC  [id number]  25 MCG Orally      Active  1 tablet



 Sodium      Once a day        on an



               empty



               stomach in



               the



               morning

 

 Zantac  NDC  [id number]  150 MG Orally      Active  1 tablet



       Once a day        at bedtime







Results

No Known Results



Summary Purpose

[employer] Submission

## 2018-11-03 NOTE — RAD REPORT
EXAM DESCRIPTION:  Jose J Single View11/3/2018 7:14 am

 

CLINICAL HISTORY:  Chest pain

 

COMPARISON:  none

 

FINDINGS:   The lungs appear clear of acute infiltrate. The heart is normal size

 

IMPRESSION:   No acute abnormalities displayed

## 2018-11-03 NOTE — XMS REPORT
[facility]

 Created on:2018



Patient:Lesley Campa

Sex:Female

:1987

External Reference #:[id number]





Demographics







 Address  [address] [place] [address]

 

 Phone  [phone]

 

 Preferred Language  en

 

 Marital Status  Unknown

 

 Nondenominational Affiliation  Unknown

 

 Race  

 

 Ethnic Group  Unknown









Author







 Organization  [employer]









Care Team Providers







 Name  Role  Phone

 

 Malia Navarrete  Provider Role  Unavailable









Allergies

No Known Allergies



Problems







 Problem Type  Condition  Code  Onset Dates  Condition Status

 

 Problem  Hypothyroidism, unspecified type  E03.9    Active

 

 Problem  Seasonal allergic rhinitis,  J30.2    Active



   unspecified trigger      

 

 Problem  Neck pain  M54.2    Active

 

 Problem  PTSD (post-traumatic stress  F43.10    Active



   disorder)      

 

 Problem  Otalgia of both ears  H92.03    Active

 

 Problem  Primary insomnia  F51.01    Active

 

 Problem  Gastroesophageal reflux disease  K21.9    Active



   without esophagitis      

 

 Problem  Depression with anxiety  F41.8    Active

 

 Problem  ADHD (attention deficit  F90.0    Active



   hyperactivity disorder), inattentive      



   type      







Medications

No Known Medications



Results

No Known Results



Summary Purpose

[employer] Submission

## 2018-11-03 NOTE — XMS REPORT
[facility]

 Created on:2018



Patient:Lesley Campa

Sex:Female

:1987

External Reference #:[id number]





Demographics







 Address  [address] [place] [address]

 

 Phone  [phone]

 

 Preferred Language  en

 

 Marital Status  Unknown

 

 [employer] Affiliation  Unknown

 

 Race  

 

 Ethnic Group   or 









Author







 Organization  [employer]









Care Team Providers







 Name  Role  Phone

 

 LagunaLio  Provider Role  Unavailable









Allergies

No Known Allergies



Problems







 Problem Type  Condition  Code  Onset Dates  Condition Status

 

 Assessment  Primary insomnia  F51.01    Active

 

 Problem  Seasonal allergic rhinitis,  J30.2    Active



   unspecified trigger      

 

 Assessment  ADHD (attention deficit  F90.0    Active



   hyperactivity disorder), inattentive      



   type      

 

 Problem  PTSD (post-traumatic stress  F43.10    Active



   disorder)      

 

 Problem  Depression with anxiety  F41.8    Active

 

 Problem  Neck pain  M54.2    Active

 

 Problem  Gastroesophageal reflux disease  K21.9    Active



   without esophagitis      

 

 Problem  Hypothyroidism, unspecified type  E03.9    Active

 

 Problem  ADHD (attention deficit  F90.0    Active



   hyperactivity disorder), inattentive      



   type      

 

 Problem  Primary insomnia  F51.01    Active

 

 Assessment  Seasonal allergic rhinitis,  J30.2    Active



   unspecified trigger      

 

 Assessment  PTSD (post-traumatic stress  F43.10    Active



   disorder)      

 

 Assessment  Depression with anxiety  F41.8    Active

 

 Assessment  Neck pain  M54.2    Active

 

 Assessment  Hypothyroidism, unspecified type  E03.9    Active







Medications







 Medication  Code  Code  Instructions  Start  End  Status  Dosage



   System      Date  Date    

 

 Levothyroxine  NDC  33323149014  25 MCG Orally      Active  1 tablet



 Sodium      Once a day        on an



               empty



               stomach in



               the



               morning

 

 Prozac  NDC  23321480637  40 MG Orally      Active  1 capsule



       Once a day        in the



               morning

 

 Zantac  NDC  09683698865  150 MG Orally      Active  1 tablet



       Once a day        at bedtime

 

 Adderall  NDC  66630830402  10 MG Orally      Active  1 tablet



       Twice a day        in the



               morning

 

 Ambien  NDC  15465947346  10 MG Orally      Active  1 tablet



       Once a day        at bedtime



               as needed







Results

No Known Results



Summary Purpose

[employer] Submission

## 2018-12-08 NOTE — XMS REPORT
[facility]

 Created on:2018



Patient:Lesley Campa

Sex:Female

:1987

External Reference #:[id number]





Demographics







 Address  [address] [place] [address]

 

 Phone  [phone]

 

 Preferred Language  en

 

 Marital Status  Unknown

 

 Congregation Affiliation  Unknown

 

 Race  

 

 Ethnic Group   or 









Author







 Organization  [employer]









Care Team Providers







 Name  Role  Phone

 

 LagunaLio  Provider Role  Unavailable









Allergies

No Known Allergies



Problems







 Problem Type  Condition  Code  Onset Dates  Condition Status

 

 Assessment  Primary insomnia  F51.01    Active

 

 Problem  Seasonal allergic rhinitis,  J30.2    Active



   unspecified trigger      

 

 Assessment  ADHD (attention deficit  F90.0    Active



   hyperactivity disorder), inattentive      



   type      

 

 Problem  PTSD (post-traumatic stress  F43.10    Active



   disorder)      

 

 Problem  Depression with anxiety  F41.8    Active

 

 Problem  Neck pain  M54.2    Active

 

 Problem  Gastroesophageal reflux disease  K21.9    Active



   without esophagitis      

 

 Problem  Hypothyroidism, unspecified type  E03.9    Active

 

 Problem  ADHD (attention deficit  F90.0    Active



   hyperactivity disorder), inattentive      



   type      

 

 Problem  Primary insomnia  F51.01    Active

 

 Assessment  Seasonal allergic rhinitis,  J30.2    Active



   unspecified trigger      

 

 Assessment  PTSD (post-traumatic stress  F43.10    Active



   disorder)      

 

 Assessment  Depression with anxiety  F41.8    Active

 

 Assessment  Neck pain  M54.2    Active

 

 Assessment  Hypothyroidism, unspecified type  E03.9    Active







Medications







 Medication  Code  Code  Instructions  Start  End  Status  Dosage



   System      Date  Date    

 

 Levothyroxine  NDC  [id number]  25 MCG Orally      Active  1 tablet



 Sodium      Once a day        on an



               empty



               stomach in



               the



               morning

 

 Prozac  NDC  34059829931  40 MG Orally      Active  1 capsule



       Once a day        in the



               morning

 

 Zantac  NDC  18179906284  150 MG Orally      Active  1 tablet



       Once a day        at bedtime

 

 Adderall  NDC  91625946428  10 MG Orally      Active  1 tablet



       Twice a day        in the



               morning

 

 Ambien  NDC  68810358059  10 MG Orally      Active  1 tablet



       Once a day        at bedtime



               as needed







Results

No Known Results



Summary Purpose

[employer] Submission

## 2018-12-08 NOTE — XMS REPORT
[facility]

 Created on:2018



Patient:Lesley Campa

Sex:Female

:1987

External Reference #:[id number]





Demographics







 Address  [address] [place] [address]

 

 Phone  [phone]

 

 Preferred Language  en

 

 Marital Status  Unknown

 

 Gnosticism Affiliation  Unknown

 

 Race  

 

 Ethnic Group  Unknown









Author







 Organization  [employer]









Care Team Providers







 Name  Role  Phone

 

 Malia Navarrete  Provider Role  Unavailable









Allergies

No Known Allergies



Problems







 Problem Type  Condition  Code  Onset Dates  Condition Status

 

 Problem  Hypothyroidism, unspecified type  E03.9    Active

 

 Problem  Seasonal allergic rhinitis,  J30.2    Active



   unspecified trigger      

 

 Problem  Neck pain  M54.2    Active

 

 Problem  PTSD (post-traumatic stress  F43.10    Active



   disorder)      

 

 Problem  Otalgia of both ears  H92.03    Active

 

 Problem  Primary insomnia  F51.01    Active

 

 Problem  Gastroesophageal reflux disease  K21.9    Active



   without esophagitis      

 

 Problem  Depression with anxiety  F41.8    Active

 

 Problem  ADHD (attention deficit  F90.0    Active



   hyperactivity disorder), inattentive      



   type      







Medications

No Known Medications



Results

No Known Results



Summary Purpose

[employer]Clinical[employer] Submission

## 2018-12-08 NOTE — XMS REPORT
[facility]

 Created on:2018



Patient:Lesley Campa

Sex:Female

:1987

External Reference #:[id number]





Demographics







 Address  [address] [place] [address]

 

 Phone  [phone]

 

 Preferred Language  en

 

 Marital Status  Unknown

 

 Church Affiliation  Unknown

 

 Race  

 

 Ethnic Group   or 









Author







 Organization  [employer]









Care Team Providers







 Name  Role  Phone

 

 Lio Laguna  Provider Role  Unavailable









Allergies

No Known Allergies



Problems







 Problem Type  Condition  Code  Onset Dates  Condition Status

 

 Problem  Seasonal allergic rhinitis,  J30.2    Active



   unspecified trigger      

 

 Problem  PTSD (post-traumatic stress  F43.10    Active



   disorder)      

 

 Problem  Depression with anxiety  F41.8    Active

 

 Problem  Neck pain  M54.2    Active

 

 Problem  Gastroesophageal reflux disease  K21.9    Active



   without esophagitis      

 

 Problem  Hypothyroidism, unspecified type  E03.9    Active

 

 Problem  ADHD (attention deficit  F90.0    Active



   hyperactivity disorder), inattentive      



   type      

 

 Problem  Primary insomnia  F51.01    Active







Medications

No Known Medications



Results

No Known Results



Summary Purpose

[employer] Submission

## 2018-12-08 NOTE — XMS REPORT
[facility]

 Created on:April 10, 2018



Patient:Lesley Campa

Sex:Female

:1987

External Reference #:[id number]





Demographics







 Address  [address] [place] [address]

 

 Phone  [phone]

 

 Preferred Language  en

 

 Marital Status  Unknown

 

 [employer] Affiliation  Unknown

 

 Race  

 

 Ethnic Group   or 









Author







 Organization  [employer]









Care Team Providers







 Name  Role  Phone

 

 Lio Laguna  Provider Role  Unavailable









Allergies

No Known Allergies



Problems







 Problem Type  Condition  Code  Onset Dates  Condition Status

 

 Problem  Seasonal allergic rhinitis,  J30.2    Active



   unspecified trigger      

 

 Problem  PTSD (post-traumatic stress  F43.10    Active



   disorder)      

 

 Problem  Depression with anxiety  F41.8    Active

 

 Problem  Neck pain  M54.2    Active

 

 Problem  Gastroesophageal reflux disease  K21.9    Active



   without esophagitis      

 

 Problem  Hypothyroidism, unspecified type  E03.9    Active

 

 Problem  ADHD (attention deficit  F90.0    Active



   hyperactivity disorder), inattentive      



   type      

 

 Problem  Primary insomnia  F51.01    Active







Medications

No Known Medications



Results

No Known Results



Summary Purpose

[employer] Submission

## 2018-12-08 NOTE — XMS REPORT
[facility]

 Created on:2018



Patient:Lesley Jones

Sex:Female

:1987

External Reference #:[id number]





Demographics







 Address  [address] [place] [address]

 

 Phone  [phone]

 

 Preferred Language  en

 

 Marital Status  Unknown

 

 Zoroastrian Affiliation  Unknown

 

 Race  

 

 Ethnic Group  Unknown









Author







 Organization  [employer]









Care Team Providers







 Name  Role  Phone

 

 Jase Lio  Provider Role  Unavailable









Allergies, Adverse Reactions, Alerts







 Substance  Reaction  Event Type

 

 tramadol  more anxious/no sleep  Drug Allergy

 

 Topamax  mouth break out  Drug Allergy

 

 Sudafed  makes heart race  Drug Allergy







Problems







 Problem Type  Condition  Code  Onset Dates  Condition Status

 

 Problem  Primary insomnia  F51.01    Active

 

 Problem  Hypothyroidism, unspecified type  E03.9    Active

 

 Problem  Seasonal allergic rhinitis,  J30.2    Active



   unspecified trigger      

 

 Problem  Otalgia of both ears  H92.03    Active

 

 Problem  Neck pain  M54.2    Active

 

 Problem  Essential tremor  G25.0    Active

 

 Problem  ADHD (attention deficit  F90.0    Active



   hyperactivity disorder), inattentive      



   type      

 

 Problem  Gastroesophageal reflux disease  K21.9    Active



   without esophagitis      

 

 Problem  PTSD (post-traumatic stress  F43.10    Active



   disorder)      

 

 Problem  Depression with anxiety  F41.8    Active

 

 Assessment  Seasonal allergic rhinitis,  J30.2    Active



   unspecified trigger      

 

 Assessment  PTSD (post-traumatic stress  F43.10    Active



   disorder)      

 

 Assessment  Neck pain  M54.2    Active

 

 Assessment  Essential tremor  G25.0    Active

 

 Assessment  Primary insomnia  F51.01    Active

 

 Assessment  Depression with anxiety  F41.8    Active

 

 Assessment  ADHD (attention deficit  F90.0    Active



   hyperactivity disorder), inattentive      



   type      

 

 Assessment  Hypothyroidism, unspecified type  E03.9    Active







Medications







 Medication  Code  Code  Instructions  Start  End  Status  Dosage



   System      Date  Date    

 

 Levothyroxine  NDC  79574716816  25 MCG Orally      Active  1 tablet



 Sodium      Once a day        on an



               empty



               stomach in



               the



               morning

 

 Duexis  NDC  57199820428  800-26.6 MG      Active  1 tablet



       Orally Three        



       times a day PRN        



       pain        

 

 Prozac  NDC  30439373472  40 MG Orally      Active  1 capsule



       Twice a day        in the



               morning

 

 Propranolol HCl  NDC  68218314250  20 MG Orally  Aug 16,    Active  1 tablet



       Twice a day        on an



               empty



               stomach

 

 Belsomra  NDC  73689132507  10 MG Orally      Active  1 tablet



       Once a day        at bedtime



               as needed

 

 Adderall  NDC  29385480188  10 MG Orally      Active  1 tablet



       Twice a day        in the



               morning

 

 Zantac  NDC  73217451823  150 MG Orally      Active  1 tablet



       Once a day        at bedtime







Results

No Known Results



Summary Purpose

[employer]ClinicalWorks Submission

## 2018-12-08 NOTE — XMS REPORT
[facility]

 Created on:2018



Patient:Lesley Campa

Sex:Female

:1987

External Reference #:[id number]





Demographics







 Address  [address] [place] [address]

 

 Phone  [phone]

 

 Preferred Language  en

 

 Marital Status  Unknown

 

 Taoism Affiliation  Unknown

 

 Race  

 

 Ethnic Group  Unknown









Author







 Organization  [employer]









Care Team Providers







 Name  Role  Phone

 

 Malia Navarrete  Provider Role  Unavailable









Allergies

No Known Allergies



Problems







 Problem Type  Condition  Code  Onset Dates  Condition Status

 

 Problem  Hypothyroidism, unspecified type  E03.9    Active

 

 Problem  Seasonal allergic rhinitis,  J30.2    Active



   unspecified trigger      

 

 Problem  Neck pain  M54.2    Active

 

 Problem  PTSD (post-traumatic stress  F43.10    Active



   disorder)      

 

 Problem  Otalgia of both ears  H92.03    Active

 

 Problem  Primary insomnia  F51.01    Active

 

 Problem  Gastroesophageal reflux disease  K21.9    Active



   without esophagitis      

 

 Problem  Depression with anxiety  F41.8    Active

 

 Problem  ADHD (attention deficit  F90.0    Active



   hyperactivity disorder), inattentive      



   type      







Medications







 Medication  Code System  Code  Instructions  Start Date  End Date  Status  
Dosage

 

 Flagyl  NDC  [id number]  500 MG Orally  ,  ,  Active  1 tablet



       Twice daily  2018    







Results

No Known Results



Summary Purpose

[employer]ClinicalWorks Submission

## 2018-12-08 NOTE — XMS REPORT
[facility]

 Created on:2018



Patient:Lesley Campa

Sex:Female

:1987

External Reference #:[id number]





Demographics







 Address  [address] [place] [address]

 

 Phone  [phone]

 

 Preferred Language  en

 

 Marital Status  Unknown

 

 Yarsani Affiliation  Unknown

 

 Race  

 

 Ethnic Group   or 









Author







 Organization  [employer]









Care Team Providers







 Name  Role  Phone

 

 Lio Laguna  Provider Role  Unavailable









Allergies

No Known Allergies



Problems







 Problem Type  Condition  Code  Onset Dates  Condition Status

 

 Assessment  Neck pain  M54.2    Active

 

 Problem  Seasonal allergic rhinitis,  J30.2    Active



   unspecified trigger      

 

 Assessment  Hypothyroidism, unspecified type  E03.9    Active

 

 Problem  PTSD (post-traumatic stress  F43.10    Active



   disorder)      

 

 Problem  Depression with anxiety  F41.8    Active

 

 Problem  Neck pain  M54.2    Active

 

 Problem  Gastroesophageal reflux disease  K21.9    Active



   without esophagitis      

 

 Problem  Hypothyroidism, unspecified type  E03.9    Active

 

 Problem  ADHD (attention deficit  F90.0    Active



   hyperactivity disorder), inattentive      



   type      

 

 Problem  Primary insomnia  F51.01    Active







Medications

No Known Medications



Results

No Known Results



Summary Purpose

[employer]ClinicalWorks Submission

## 2018-12-08 NOTE — XMS REPORT
[facility]

 Created on:August 15, 2018



Patient:Lesley Jones

Sex:Female

:1987

External Reference #:[id number]





Demographics







 Address  [address] [place] [address]

 

 Phone  [phone]

 

 Preferred Language  en

 

 Marital Status  Unknown

 

 Jain Affiliation  Unknown

 

 Race  

 

 Ethnic Group  Unknown









Author







 Organization  [employer]









Care Team Providers







 Name  Role  Phone

 

 Jase Loi  Provider Role  Unavailable









Allergies

No Known Allergies



Problems







 Problem Type  Condition  Code  Onset Dates  Condition Status

 

 Problem  Hypothyroidism, unspecified type  E03.9    Active

 

 Problem  Seasonal allergic rhinitis,  J30.2    Active



   unspecified trigger      

 

 Problem  Neck pain  M54.2    Active

 

 Problem  PTSD (post-traumatic stress  F43.10    Active



   disorder)      

 

 Problem  Otalgia of both ears  H92.03    Active

 

 Problem  Primary insomnia  F51.01    Active

 

 Problem  Gastroesophageal reflux disease  K21.9    Active



   without esophagitis      

 

 Problem  Depression with anxiety  F41.8    Active

 

 Problem  ADHD (attention deficit  F90.0    Active



   hyperactivity disorder), inattentive      



   type      







Medications

No Known Medications



Results

No Known Results



Summary Purpose

[employer]ClinicalWorks Submission

## 2018-12-08 NOTE — XMS REPORT
[facility]

 Created on:2018



Patient:Lesley Campa

Sex:Female

:1987

External Reference #:[id number]





Demographics







 Address  [address] [place] [address]

 

 Phone  [phone]

 

 Preferred Language  en

 

 Marital Status  Unknown

 

 Muslim Affiliation  Unknown

 

 Race  

 

 Ethnic Group   or 









Author







 Organization  [employer]









Care Team Providers







 Name  Role  Phone

 

 Lio Laguna  Provider Role  Unavailable









Allergies, Adverse Reactions, Alerts







 Substance  Reaction  Event Type

 

 tramadol  more anxious/no sleep  Drug Allergy

 

 Topamax  mouth break out  Drug Allergy

 

 Sudafed  makes heart race  Drug Allergy







Problems







 Problem Type  Condition  Code  Onset Dates  Condition Status

 

 Assessment  ADHD (attention deficit  F90.0    Active



   hyperactivity disorder), inattentive      



   type      

 

 Problem  Hypothyroidism, unspecified type  E03.9    Active

 

 Problem  Seasonal allergic rhinitis,  J30.2    Active



   unspecified trigger      

 

 Problem  Neck pain  M54.2    Active

 

 Problem  PTSD (post-traumatic stress  F43.10    Active



   disorder)      

 

 Problem  Otalgia of both ears  H92.03    Active

 

 Problem  Primary insomnia  F51.01    Active

 

 Problem  Gastroesophageal reflux disease  K21.9    Active



   without esophagitis      

 

 Problem  Depression with anxiety  F41.8    Active

 

 Problem  ADHD (attention deficit  F90.0    Active



   hyperactivity disorder), inattentive      



   type      

 

 Assessment  Otalgia of both ears  H92.03    Active

 

 Assessment  PTSD (post-traumatic stress  F43.10    Active



   disorder)      

 

 Assessment  Depression with anxiety  F41.8    Active

 

 Assessment  Neck pain  M54.2    Active

 

 Assessment  Hypothyroidism, unspecified type  E03.9    Active

 

 Assessment  Seasonal allergic rhinitis,  J30.2    Active



   unspecified trigger      

 

 Assessment  Primary insomnia  F51.01    Active







Medications







 Medication  Code  Code  Instructions  Start  End  Status  Dosage



   System      Date  Date    

 

 Prozac  NDC  59879724656  40 MG Orally      Active  1 capsule



       Twice a day        in the



               morning

 

 Levothyroxine  NDC  52218731715  25 MCG Orally      Active  1 tablet



 Sodium      Once a day        on an



               empty



               stomach in



               the



               morning

 

 Amoxicillin  NDC  43901926617  500 MG Orally    Active  1 capsule



       every 8 hrs  2018    

 

 Ambien  NDC  27172644722  10 MG Orally      Active  1 tablet



       Once a day        at bedtime



               as needed

 

 Adderall  NDC  97463393393  10 MG Orally      Active  1 tablet



       Twice a day        in the



               morning

 

 Zantac  NDC  20838690644  150 MG Orally      Active  1 tablet



       Once a day        at bedtime

 

 Duexis  NDC  32595901185  800-26.6 MG      Active  1 tablet



       Orally Three        



       times a day PRN        



       pain        







Results

No Known Results



Summary Purpose

[employer]ClinicalWorks Submission

## 2018-12-08 NOTE — XMS REPORT
[facility]

 Created on:August 3, 2018



Patient:Lesley Jones

Sex:Female

:1987

External Reference #:[id number]





Demographics







 Address  [address] [place] [address]

 

 Phone  [phone]

 

 Preferred Language  en

 

 Marital Status  Unknown

 

 Zoroastrianism Affiliation  Unknown

 

 Race  

 

 Ethnic Group  Unknown









Author







 Organization  [employer]









Care Team Providers







 Name  Role  Phone

 

 Jase Lio  Provider Role  Unavailable









Allergies

No Known Allergies



Problems







 Problem Type  Condition  Code  Onset Dates  Condition Status

 

 Problem  Hypothyroidism, unspecified type  E03.9    Active

 

 Problem  Seasonal allergic rhinitis,  J30.2    Active



   unspecified trigger      

 

 Assessment  Hypothyroidism, unspecified type  E03.9    Active

 

 Problem  Neck pain  M54.2    Active

 

 Problem  PTSD (post-traumatic stress  F43.10    Active



   disorder)      

 

 Problem  Otalgia of both ears  H92.03    Active

 

 Problem  Primary insomnia  F51.01    Active

 

 Problem  Gastroesophageal reflux disease  K21.9    Active



   without esophagitis      

 

 Problem  Depression with anxiety  F41.8    Active

 

 Problem  ADHD (attention deficit  F90.0    Active



   hyperactivity disorder), inattentive      



   type      







Medications







 Medication  Code  Code  Instructions  Start  End  Status  Dosage



   System      Date  Date    

 

 Levothyroxine  NDC  [id number]  25 MCG Orally      Active  1 tablet



 Sodium      Once a day        on an



               empty



               stomach in



               the



               morning







Results

No Known Results



Summary Purpose

[employer] Submission

## 2018-12-08 NOTE — EDPHYS
Physician Documentation                                                                           

 [facility]                                                                

Name: Lesley Jones                                                                             

Age: 31 yrs                                                                                       

Sex: Female                                                                                       

: 1987                                                                                   

MRN: [id number]                                                                                   

Arrival Date: 2018                                                                          

Time: 16:32                                                                                       

Account#: [id number]                                                                            

Bed 25                                                                                            

Private MD: [facility]                                                                         

ED Physician Ephraim Fry                                                                      

HPI:                                                                                              

                                                                                             

17:08 This 31 yrs old  Female presents to ER via Ambulatory with complaints of Neck  snw 

      Swelling.                                                                                   

17:08 The patient or guardian complains of spasm, swelling, tenderness. The symptoms are      snw 

      located on the right lateral aspect of neck. Onset: The symptoms/episode began/occurred     

      suddenly, today, upon awakening. Context: The problem was sustained at an unknown           

      location, The neck injury/problem resulted from unknown. Associated signs and symptoms:     

      The patient has no apparent associated signs or symptoms. The pain does not radiate.        

      Modifying factors: The symptoms are alleviated by nothing. the symptoms are aggravated      

      by movement, pressure. Severity of symptoms: At their worst the symptoms were moderate.     

      The patient has not experienced similar symptoms in the past. The patient has not           

      recently seen a physician. denies injury, fever, sore throat, ear pain.                     

                                                                                                  

Historical:                                                                                       

- Allergies:                                                                                      

16:46 Sudafed (RAPID HEART RATE);                                                             sv  

16:46 Tramadol HCl (Unable to sleep);                                                         sv  

- PMHx:                                                                                           

16:46 ADD/ADHD; Anxiety; Depression; ESSENTIAL TREMORS; Hypothyroidism;                       sv  

- PSHx:                                                                                           

16:46 ; Cholecystectomy; Lap band;                                                   sv  

                                                                                                  

- Immunization history:: Adult Immunizations up to date.                                          

- Social history:: Smoking status: Patient uses tobacco products, smokes one-half pack            

  cigarettes per day.                                                                             

- Ebola Screening: : No symptoms or risks identified at this time.                                

                                                                                                  

                                                                                                  

ROS:                                                                                              

17:00 Constitutional: Negative for fever, chills, and weight loss, Eyes: Negative for injury, snw 

      pain, redness, and discharge, ENT: Negative for injury, pain, and discharge,                

      Cardiovascular: Negative for chest pain, palpitations, and edema, Respiratory: Negative     

      for shortness of breath, cough, wheezing, and pleuritic chest pain, Abdomen/GI:             

      Negative for abdominal pain, nausea, vomiting, diarrhea, and constipation, Back:            

      Negative for injury and pain, : Negative for injury, bleeding, discharge, and             

      swelling, MS/Extremity: Negative for injury and deformity, Skin: Negative for injury,       

      rash, and discoloration, Neuro: Negative for headache, weakness, numbness, tingling,        

      and seizure, Psych: Negative for depression, anxiety, suicide ideation, homicidal           

      ideation, and hallucinations.                                                               

17:00 Neck: Positive for swelling, tenderness, of the left lateral aspect of neck.                

                                                                                                  

Exam:                                                                                             

16:59 Constitutional:  This is a well developed, well nourished patient who is awake, alert,  snw 

      and in no acute distress. Head/Face:  Normocephalic, atraumatic. Eyes:  Pupils equal        

      round and reactive to light, extra-ocular motions intact.  Lids and lashes normal.          

      Conjunctiva and sclera are non-icteric and not injected.  Cornea within normal limits.      

      Periorbital areas with no swelling, redness, or edema. ENT:  Nares patent. No nasal         

      discharge, no septal abnormalities noted.  Tympanic membranes are normal and external       

      auditory canals are clear.  Oropharynx with no redness, swelling, or masses, exudates,      

      or evidence of obstruction, uvula midline.  Mucous membranes moist. Chest/axilla:           

      Normal chest wall appearance and motion.  Nontender with no deformity.  No lesions are      

      appreciated. Cardiovascular:  Regular rate and rhythm with a normal S1 and S2.  No          

      gallops, murmurs, or rubs.  Normal PMI, no JVD.  No pulse deficits. Respiratory:  Lungs     

      have equal breath sounds bilaterally, clear to auscultation and percussion.  No rales,      

      rhonchi or wheezes noted.  No increased work of breathing, no retractions or nasal          

      flaring. Abdomen/GI:  Soft, non-tender, with normal bowel sounds.  No distension or         

      tympany.  No guarding or rebound.  No evidence of tenderness throughout. Back:  No          

      spinal tenderness.  No costovertebral tenderness.  Full range of motion. Skin:  Warm,       

      dry with normal turgor.  Normal color with no rashes, no lesions, and no evidence of        

      cellulitis. MS/ Extremity:  Pulses equal, no cyanosis.  Neurovascular intact.  Full,        

      normal range of motion. Neuro:  Awake and alert, GCS 15, oriented to person, place,         

      time, and situation.  Cranial nerves II-XII grossly intact.  Motor strength 5/5 in all      

      extremities.  Sensory grossly intact.  Cerebellar exam normal.  Normal gait.                

16:59 Neck: External neck: tenderness, that is moderate, of the  right lateral aspect of          

      neck, tender, tense, muscle chain.                                                          

                                                                                                  

Vital Signs:                                                                                      

16:47  / 84; Pulse 76; Resp 20; Temp 98.7; Pulse Ox 99% ; Weight 99.79 kg; Height 5 ft. sv  

      2 in. (157.48 cm); Pain 6/10;                                                               

16:47 Body Mass Index 40.24 (99.79 kg, 157.48 cm)                                             sv  

                                                                                                  

MDM:                                                                                              

16:49 Patient medically screened.                                                             snw 

                                                                                             

07:40 Data reviewed: vital signs, nurses notes. Data interpreted: Pulse oximetry: on room air snw 

      is 99 %. Counseling: I had a detailed discussion with the patient and/or guardian           

      regarding: the historical points, exam findings, and any diagnostic results supporting      

      the discharge/admit diagnosis, the need for outpatient follow up, for definitive care.      

07:45 Response to treatment: the patient's symptoms have markedly improved after treatment.   snw 

      Special discussion: I have referred the patient to see his PCP for further evaluation       

      of high blood pressure. Based on the history and exam findings, there is no indication      

      for further emergent testing or inpatient evaluation. I discussed with the                  

      patient/guardian the need to see the primary care provider for further evaluation of        

      the symptoms.                                                                               

                                                                                                  

Administered Medications:                                                                         

                                                                                             

17:10 Drug: Valium 5 mg Route: PO;                                                            ls4 

17:37 Follow up: Response: No adverse reaction; Marked relief of symptoms                     ls4 

17:10 Drug: TORadol 60 mg Route: IM; Site: left deltoid;                                      ls4 

17:37 Follow up: Response: No adverse reaction                                                ls4 

17:37 Follow up: Response: Marked relief of symptoms                                          ls4 

                                                                                                  

                                                                                                  

Disposition:                                                                                      

18 17:46 Discharged to Home. Impression: Muscle spasm - lateral cervical.                   

- Condition is Stable.                                                                            

- Discharge Instructions: Muscle Cramps and Spasms, Heat Therapy.                                 

- Prescriptions for Diclofenac Sodium 75 mg Oral Tablet Sustained Release - take 1                

  tablet by ORAL route 2 times per day; 30 tablet. orphenadrine citrate 100 mg Oral               

  Tablet Sustained Release - take 1 tablet by ORAL route 2 times per day As needed; 20            

  tablet.                                                                                         

- Work release form, Medication Reconciliation Form, Thank You Letter, Antibiotic                 

  Education, Prescription Opioid Use form.                                                        

- Follow up: [facility]; When: 2 - 3 days; Reason: Recheck today's complaints,                  

  Continuance of care, Re-evaluation by your physician. Follow up: Emergency                      

  Department; When: As needed; Reason: Worsening of condition.                                    

                                                                                                  

                                                                                                  

                                                                                                  

Addendum:                                                                                         

12/10/2018                                                                                        

     06:29 Co-signature as Attending Physician, Ephraim Fry MD I agree with the assessment and  c
ha

           plan of care.                                                                          

                                                                                                  

Signatures:                                                                                       

Alaina Kimbrough, RN                    RN   Ephraim Peck MD MD cha Therrien, Shelly, FNP-C                 FNP-Csnw                                                  

Deepika Faith, FNP                        FNP  nh                                                   

Kerrie Silva RN RN   ss                                                   

Raya Moran RN                       RN   ls4                                                  

                                                                                                  

Corrections: (The following items were deleted from the chart)                                    

                                                                                             

17:53 17:46 2018 17:46 Discharged to Home. Impression: Muscle spasm - lateral cervical. ss  

      Condition is Stable. Discharge Instructions: Muscle Cramps and Spasms, Heat Therapy.        

      Prescriptions for Diclofenac Sodium 75 mg Oral Tablet Sustained Release - take 1 tablet     

      by ORAL route 2 times per day; 30 tablet, orphenadrine citrate 100 mg Oral Tablet           

      Sustained Release - take 1 tablet by ORAL route 2 times per day As needed; 20 tablet.       

      and Forms are Work release form, Medication Reconciliation Form, Thank You Letter,          

      Antibiotic Education, Prescription Opioid Use. Follow up: [facility]; When: 2 - 3         

      days; Reason: Recheck today's complaints, Continuance of care, Re-evaluation by your        

      physician. Follow up: Emergency Department; When: As needed; Reason: Worsening of           

      condition. nh                                                                               

                                                                                                  

**************************************************************************************************

## 2018-12-08 NOTE — XMS REPORT
[facility]

 Created on:2018



Patient:Lesley Jones

Sex:Female

:1987

External Reference #:[id number]





Demographics







 Address  [address] [place] [address]

 

 Phone  [phone]

 

 Preferred Language  en

 

 Marital Status  Unknown

 

 Baptism Affiliation  Unknown

 

 Race  

 

 Ethnic Group  Unknown









Author







 Organization  [employer]









Care Team Providers







 Name  Role  Phone

 

 Jase Lio  Provider Role  Unavailable









Allergies, Adverse Reactions, Alerts







 Substance  Reaction  Event Type

 

 tramadol  more anxious/no sleep  Drug Allergy

 

 Topamax  mouth break out  Drug Allergy

 

 Sudafed  makes heart race  Drug Allergy







Problems







 Problem Type  Condition  Code  Onset Dates  Condition Status

 

 Problem  Primary insomnia  F51.01    Active

 

 Problem  Hypothyroidism, unspecified type  E03.9    Active

 

 Problem  Seasonal allergic rhinitis,  J30.2    Active



   unspecified trigger      

 

 Problem  Otalgia of both ears  H92.03    Active

 

 Problem  Neck pain  M54.2    Active

 

 Problem  Essential tremor  G25.0    Active

 

 Problem  ADHD (attention deficit  F90.0    Active



   hyperactivity disorder), inattentive      



   type      

 

 Problem  Gastroesophageal reflux disease  K21.9    Active



   without esophagitis      

 

 Problem  PTSD (post-traumatic stress  F43.10    Active



   disorder)      

 

 Problem  Depression with anxiety  F41.8    Active

 

 Assessment  Seasonal allergic rhinitis,  J30.2    Active



   unspecified trigger      

 

 Assessment  PTSD (post-traumatic stress  F43.10    Active



   disorder)      

 

 Assessment  Essential tremor  G25.0    Active

 

 Assessment  Primary insomnia  F51.01    Active

 

 Assessment  Depression with anxiety  F41.8    Active

 

 Assessment  ADHD (attention deficit  F90.0    Active



   hyperactivity disorder), inattentive      



   type      

 

 Assessment  Hypothyroidism, unspecified type  E03.9    Active







Medications







 Medication  Code  Code  Instructions  Start  End  Status  Dosage



   System      Date  Date    

 

 Prozac  NDC  61018746052  40 MG Orally      Active  1 capsule



       Twice a day        in the



               morning

 

 Levothyroxine  NDC  37213298941  25 MCG Orally      Active  1 tablet



 Sodium      Once a day        on an



               empty



               stomach in



               the



               morning

 

 Adderall  NDC  74361198446  10 MG Orally      Active  1 tablet



       Twice a day        in the



               morning

 

 Propranolol HCl  NDC  40617515676  20 MG Orally      Active  1 tablet



       Twice a day        on an



               empty



               stomach

 

 Belsomra  NDC  19469954384  10 MG Orally      Active  1 tablet



       Once a day        at bedtime



               as needed

 

 Zantac  NDC  59899416893  150 MG Orally      Active  1 tablet



       Once a day        at bedtime

 

 Duexis  NDC  96473185419  800-26.6 MG      Active  1 tablet



       Orally Three        



       times a day PRN        



       pain        







Results

No Known Results



Summary Purpose

[employer] Submission

## 2018-12-08 NOTE — XMS REPORT
[facility]

 Created on:2018



Patient:Lesley Campa

Sex:Female

:1987

External Reference #:[id number]





Demographics







 Address  [address] [place] [id number]

 

 Phone  [phone]

 

 Preferred Language  en

 

 Marital Status  Unknown

 

 Religion Affiliation  Unknown

 

 Race  

 

 Ethnic Group  Unknown









Author







 Organization  [employer]









Care Team Providers







 Name  Role  Phone

 

 Malia Navarrete  Provider Role  Unavailable









Allergies, Adverse Reactions, Alerts







 Substance  Reaction  Event Type

 

 tramadol  more anxious/no sleep  Drug Allergy

 

 Topamax  mouth break out  Drug Allergy

 

 Sudafed  makes heart race  Drug Allergy







Problems







 Problem Type  Condition  Code  Onset Dates  Condition Status

 

 Problem  Hypothyroidism, unspecified type  E03.9    Active

 

 Problem  Seasonal allergic rhinitis,  J30.2    Active



   unspecified trigger      

 

 Assessment  Women's annual routine  Z01.419    Active



   gynecological examination      

 

 Assessment  Generalized abdominal pain  R10.84    Active

 

 Assessment  Vagina itching  N89.8    Active

 

 Problem  Neck pain  M54.2    Active

 

 Problem  PTSD (post-traumatic stress  F43.10    Active



   disorder)      

 

 Problem  Otalgia of both ears  H92.03    Active

 

 Problem  Primary insomnia  F51.01    Active

 

 Problem  Gastroesophageal reflux disease  K21.9    Active



   without esophagitis      

 

 Problem  Depression with anxiety  F41.8    Active

 

 Problem  ADHD (attention deficit  F90.0    Active



   hyperactivity disorder),      



   inattentive type      







Medications







 Medication  Code  Code  Instructions  Start  End  Status  Dosage



   System      Date  Date    

 

 Duexis  NDC  23801786355  800-26.6 MG      Active  1 tablet



       Orally Three        



       times a day PRN        



       pain        

 

 Prozac  NDC  81892884189  40 MG Orally      Active  1 capsule



       Twice a day        in the



               morning

 

 Ambien  NDC  84348416348  10 MG Orally      Active  1 tablet



       Once a day        at bedtime



               as needed

 

 Adderall  NDC  21469918038  10 MG Orally      Active  1 tablet



       Twice a day        in the



               morning

 

 Levothyroxine  NDC  27201090781  25 MCG Orally      Active  1 tablet



 Sodium      Once a day        on an



               empty



               stomach in



               the



               morning

 

 Zantac  NDC  23159617402  150 MG Orally      Active  1 tablet



       Once a day        at bedtime







Results







 Name  Result  Date  Reference Range  Unit  Abnormality Flag

 

 URINALYSIS AUTO W/O SCOPE          



 (19900)          

 

 ----NIT  neg  2018      

 

 ----URO  0.2  2018      

 

 ----PROTEIN  neg  2018      

 

 ----pH  6.0  2018      

 

 ----BLO  2+  2018      

 

 ----GLUCOSE  neg  2018      

 

 ----WILLI  neg  2018      

 

 ----BILIRUBIN  neg  2018      

 

 ----KETONES  neg  2018      

 

 ----SPECIFIC GRAVITY  1.030  2018      







Summary Purpose

[employer] Submission

## 2018-12-08 NOTE — XMS REPORT
[facility]

 Created on:2018



Patient:Lesley Jones

Sex:Female

:1987

External Reference #:[id number]





Demographics







 Address  [address] [place] [address]

 

 Phone  [phone]

 

 Preferred Language  en

 

 Marital Status  Unknown

 

 Caodaism Affiliation  Unknown

 

 Race  

 

 Ethnic Group  Unknown









Author







 Organization  [employer]









Care Team Providers







 Name  Role  Phone

 

 Lio Laguna  Provider Role  Unavailable









Allergies

No Known Allergies



Problems







 Problem Type  Condition  Code  Onset Dates  Condition Status

 

 Problem  Hypothyroidism, unspecified type  E03.9    Active

 

 Problem  Seasonal allergic rhinitis,  J30.2    Active



   unspecified trigger      

 

 Problem  Neck pain  M54.2    Active

 

 Problem  PTSD (post-traumatic stress  F43.10    Active



   disorder)      

 

 Problem  Otalgia of both ears  H92.03    Active

 

 Problem  Primary insomnia  F51.01    Active

 

 Problem  Gastroesophageal reflux disease  K21.9    Active



   without esophagitis      

 

 Problem  Depression with anxiety  F41.8    Active

 

 Problem  ADHD (attention deficit  F90.0    Active



   hyperactivity disorder), inattentive      



   type      

 

 Assessment  Otalgia of both ears  H92.03    Active

 

 Assessment  Neck pain  M54.2    Active

 

 Assessment  Depression with anxiety  F41.8    Active

 

 Assessment  Hypothyroidism, unspecified type  E03.9    Active

 

 Assessment  Seasonal allergic rhinitis,  J30.2    Active



   unspecified trigger      

 

 Assessment  Primary insomnia  F51.01    Active

 

 Assessment  PTSD (post-traumatic stress  F43.10    Active



   disorder)      

 

 Assessment  ADHD (attention deficit  F90.0    Active



   hyperactivity disorder), inattentive      



   type      







Medications







 Medication  Code  Code  Instructions  Start  End  Status  Dosage



   System      Date  Date    

 

 Belsomra  NDC  [id number]  10 MG Orally  ,    Active  1 tablet



       Once a day        at



               bedtime



               as needed

 

 Adderall  NDC  13651039465  10 MG Orally      Active  1 tablet



       Twice a day        in the



               morning

 

 Zantac  NDC  05416356861  150 MG Orally      Active  1 tablet



       Once a day        at



               bedtime

 

 Prozac  NDC  98888514457  40 MG Orally      Active  1 capsule



       Twice a day        in the



               morning

 

 Levothyroxine  NDC  88959763552  25 MCG Orally      Active  1 tablet



 Sodium      Once a day        on an



               empty



               stomach



               in the



               morning

 

 Duexis  NDC  37861509882  800-26.6 MG      Active  1 tablet



       Orally Three        



       times a day PRN        



       pain        

 

 Ambien  NDC  [id number]  10 MG Orally      Inactive  1 tablet



       Once a day        at



               bedtime



               as needed







Results

No Known Results



Summary Purpose

[employer]Clinical[employer] Submission

## 2018-12-08 NOTE — XMS REPORT
[facility]

 Created on:May 4, 2018



Patient:Lesley Campa

Sex:Female

:1987

External Reference #:[id number]





Demographics







 Address  [address] [place] [address]

 

 Phone  [phone]

 

 Preferred Language  en

 

 Marital Status  Unknown

 

 Scientology Affiliation  Unknown

 

 Race  

 

 Ethnic Group   or 









Author







 Organization  [employer]









Care Team Providers







 Name  Role  Phone

 

 Lio Laguna  Provider Role  Unavailable









Allergies

No Known Allergies



Problems







 Problem Type  Condition  Code  Onset Dates  Condition Status

 

 Problem  Seasonal allergic rhinitis,  J30.2    Active



   unspecified trigger      

 

 Assessment  Depression with anxiety  F41.8    Active

 

 Problem  PTSD (post-traumatic stress  F43.10    Active



   disorder)      

 

 Problem  Depression with anxiety  F41.8    Active

 

 Problem  Neck pain  M54.2    Active

 

 Problem  Gastroesophageal reflux disease  K21.9    Active



   without esophagitis      

 

 Problem  Hypothyroidism, unspecified type  E03.9    Active

 

 Problem  ADHD (attention deficit  F90.0    Active



   hyperactivity disorder), inattentive      



   type      

 

 Problem  Primary insomnia  F51.01    Active







Medications







 Medication  Code System  Code  Instructions  Start  End Date  Status  Dosage



         Date      

 

 Prozac  NDC  [id number]  40 MG Orally      Active  1 capsule



       Once a day        in the



               morning







Results

No Known Results



Summary Purpose

[employer]Clinical[employer] Submission

## 2018-12-08 NOTE — XMS REPORT
[facility]

 Created on:May 23, 2018



Patient:Lesley Campa

Sex:Female

:1987

External Reference #:[id number]





Demographics







 Address  [address] [place] [address]

 

 Phone  [phone]

 

 Preferred Language  en

 

 Marital Status  Unknown

 

 Islam Affiliation  Unknown

 

 Race  

 

 Ethnic Group   or 









Author







 Organization  [employer]









Care Team Providers







 Name  Role  Phone

 

 Lio Laguna  Provider Role  Unavailable









Allergies, Adverse Reactions, Alerts







 Substance  Reaction  Event Type

 

 tramadol  more anxious/no sleep  Drug Allergy

 

 Topamax  mouth break out  Drug Allergy

 

 Sudafed  makes heart race  Drug Allergy







Problems







 Problem Type  Condition  Code  Onset Dates  Condition Status

 

 Assessment  Primary insomnia  F51.01    Active

 

 Problem  Seasonal allergic rhinitis,  J30.2    Active



   unspecified trigger      

 

 Assessment  ADHD (attention deficit  F90.0    Active



   hyperactivity disorder), inattentive      



   type      

 

 Problem  PTSD (post-traumatic stress  F43.10    Active



   disorder)      

 

 Problem  Depression with anxiety  F41.8    Active

 

 Problem  Neck pain  M54.2    Active

 

 Problem  Gastroesophageal reflux disease  K21.9    Active



   without esophagitis      

 

 Problem  Hypothyroidism, unspecified type  E03.9    Active

 

 Problem  ADHD (attention deficit  F90.0    Active



   hyperactivity disorder), inattentive      



   type      

 

 Problem  Primary insomnia  F51.01    Active

 

 Assessment  Seasonal allergic rhinitis,  J30.2    Active



   unspecified trigger      

 

 Assessment  PTSD (post-traumatic stress  F43.10    Active



   disorder)      

 

 Assessment  Depression with anxiety  F41.8    Active

 

 Assessment  Neck pain  M54.2    Active

 

 Assessment  Hypothyroidism, unspecified type  E03.9    Active







Medications







 Medication  Code  Code  Instructions  Start  End  Status  Dosage



   System      Date  Date    

 

 Prozac  NDC  [id number]  40 MG Orally      Active  1 capsule



       Once a day        in the



               morning

 

 Duexis  NDC  80343890696  800-26.6 MG  May 22,  July  Active  1 tablet



       Orally Three    21,    



       times a day PRN        



       pain        

 

 Ambien  NDC  17203941775  10 MG Orally      Active  1 tablet



       Once a day        at bedtime



               as needed

 

 Adderall  NDC  23878183263  10 MG Orally      Active  1 tablet



       Twice a day        in the



               morning

 

 Levothyroxine  NDC  89618980230  25 MCG Orally      Active  1 tablet



 Sodium      Once a day        on an



               empty



               stomach in



               the



               morning

 

 Zantac  NDC  48708146154  150 MG Orally      Active  1 tablet



       Once a day        at bedtime







Results

No Known Results



Summary Purpose

[employer]ClinicalWorks Submission

## 2018-12-08 NOTE — XMS REPORT
[facility]

 Created on:2018



Patient:Lesley Campa

Sex:Female

:1987

External Reference #:[id number]





Demographics







 Address  [address] [place] [address]

 

 Phone  [phone]

 

 Preferred Language  en

 

 Marital Status  Unknown

 

 Jain Affiliation  Unknown

 

 Race  

 

 Ethnic Group  Unknown









Author







 Organization  [employer]









Care Team Providers







 Name  Role  Phone

 

 Malia Navarrete  Provider Role  Unavailable









Allergies

No Known Allergies



Problems







 Problem Type  Condition  Code  Onset Dates  Condition Status

 

 Problem  Hypothyroidism, unspecified type  E03.9    Active

 

 Problem  Seasonal allergic rhinitis,  J30.2    Active



   unspecified trigger      

 

 Problem  Neck pain  M54.2    Active

 

 Problem  PTSD (post-traumatic stress  F43.10    Active



   disorder)      

 

 Problem  Otalgia of both ears  H92.03    Active

 

 Problem  Primary insomnia  F51.01    Active

 

 Problem  Gastroesophageal reflux disease  K21.9    Active



   without esophagitis      

 

 Problem  Depression with anxiety  F41.8    Active

 

 Problem  ADHD (attention deficit  F90.0    Active



   hyperactivity disorder), inattentive      



   type      







Medications

No Known Medications



Results

No Known Results



Summary Purpose

[employer]ClinicalWorks Submission

## 2018-12-08 NOTE — XMS REPORT
[facility]

 Created on:2018



Patient:Lesley Jones

Sex:Female

:1987

External Reference #:[id number]





Demographics







 Address  [address] [place] [address]

 

 Phone  [phone]

 

 Preferred Language  en

 

 Marital Status  Unknown

 

 Yarsani Affiliation  Unknown

 

 Race  

 

 Ethnic Group  Unknown









Author







 Organization  [employer]









Care Team Providers







 Name  Role  Phone

 

 Jase Lio  Provider Role  Unavailable









Allergies

No Known Allergies



Problems







 Problem Type  Condition  Code  Onset Dates  Condition Status

 

 Problem  Hypothyroidism, unspecified type  E03.9    Active

 

 Problem  Seasonal allergic rhinitis,  J30.2    Active



   unspecified trigger      

 

 Problem  Neck pain  M54.2    Active

 

 Problem  PTSD (post-traumatic stress  F43.10    Active



   disorder)      

 

 Problem  Otalgia of both ears  H92.03    Active

 

 Problem  Primary insomnia  F51.01    Active

 

 Problem  Gastroesophageal reflux disease  K21.9    Active



   without esophagitis      

 

 Problem  Depression with anxiety  F41.8    Active

 

 Problem  ADHD (attention deficit  F90.0    Active



   hyperactivity disorder), inattentive      



   type      







Medications

No Known Medications



Results

No Known Results



Summary Purpose

[employer]ClinicalWorks Submission

## 2018-12-08 NOTE — XMS REPORT
[facility]

 Created on:May 29, 2018



Patient:Lesley Campa

Sex:Female

:1987

External Reference #:[id number]





Demographics







 Address  [address] [place] [address]

 

 Phone  [phone]

 

 Preferred Language  en

 

 Marital Status  Unknown

 

 Yarsani Affiliation  Unknown

 

 Race  

 

 Ethnic Group   or 









Author







 Organization  [employer]









Care Team Providers







 Name  Role  Phone

 

 Lio Laguna  Provider Role  Unavailable









Allergies

No Known Allergies



Problems







 Problem Type  Condition  Code  Onset Dates  Condition Status

 

 Problem  Seasonal allergic rhinitis,  J30.2    Active



   unspecified trigger      

 

 Problem  PTSD (post-traumatic stress  F43.10    Active



   disorder)      

 

 Problem  Depression with anxiety  F41.8    Active

 

 Problem  Neck pain  M54.2    Active

 

 Problem  Gastroesophageal reflux disease  K21.9    Active



   without esophagitis      

 

 Problem  Hypothyroidism, unspecified type  E03.9    Active

 

 Problem  ADHD (attention deficit  F90.0    Active



   hyperactivity disorder), inattentive      



   type      

 

 Problem  Primary insomnia  F51.01    Active







Medications

No Known Medications



Results

No Known Results



Summary Purpose

[employer] Submission

## 2018-12-08 NOTE — ER
Nurse's Notes                                                                                     

 [facility]                                                                

Name: Lesley Jones                                                                             

Age: 31 yrs                                                                                       

Sex: Female                                                                                       

: 1987                                                                                   

MRN: [id number]                                                                                   

Arrival Date: 2018                                                                          

Time: 16:32                                                                                       

Account#: [id number]                                                                            

Bed 25                                                                                            

Private MD: Lio Laguna                                                                         

Diagnosis: Muscle spasm-lateral cervical                                                          

                                                                                                  

Presentation:                                                                                     

                                                                                             

16:45 Presenting complaint: Patient states: left sided neck swelling started today around     sv  

      0300. c/o left sided head, neck pain. Transition of care: patient was not received from     

      another setting of care. Onset of symptoms was 2018 at 03:00. Care prior       

      to arrival: None.                                                                           

16:45 Method Of Arrival: Ambulatory                                                           sv  

16:45 Acuity: KAYLA 3                                                                           sv  

16:55 Risk Assessment: Do you want to hurt yourself or someone else? Patient reports no       ls4 

      desire to harm self or others. Initial Sepsis Screen: Does the patient meet any 2           

      criteria? No. Patient's initial sepsis screen is negative. Does the patient have a          

      suspected source of infection? No. Patient's initial sepsis screen is negative.             

                                                                                                  

Triage Assessment:                                                                                

16:47 General: Appears in no apparent distress. uncomfortable, obese, Behavior is calm,       sv  

      cooperative, appropriate for age. Pain: Complains of pain in left frontal area, left        

      temporal area, left ear, left posterior aspect of neck, left lateral aspect of neck and     

      left anterior aspect of neck Pain currently is 6 out of 10 on a pain scale. Neuro:          

      Level of Consciousness is awake, alert, obeys commands, Oriented to person, place,          

      time, situation, Moves all extremities. Full function Gait is steady, Speech is normal.     

      Respiratory: Respiratory effort is even, unlabored, Respiratory pattern is regular,         

      symmetrical.                                                                                

                                                                                                  

Historical:                                                                                       

- Allergies:                                                                                      

16:46 Sudafed (RAPID HEART RATE);                                                             sv  

16:46 Tramadol HCl (Unable to sleep);                                                         sv  

- PMHx:                                                                                           

16:46 ADD/ADHD; Anxiety; Depression; ESSENTIAL TREMORS; Hypothyroidism;                       sv  

- PSHx:                                                                                           

16:46 ; Cholecystectomy; Lap band;                                                   sv  

                                                                                                  

- Immunization history:: Adult Immunizations up to date.                                          

- Social history:: Smoking status: Patient uses tobacco products, smokes one-half pack            

  cigarettes per day.                                                                             

- Ebola Screening: : No symptoms or risks identified at this time.                                

                                                                                                  

                                                                                                  

Screenin:55 Abuse screen: Denies threats or abuse. Denies injuries from another. Nutritional        ls4 

      screening: No deficits noted. Tuberculosis screening: No symptoms or risk factors           

      identified. Fall Risk None identified.                                                      

                                                                                                  

Vital Signs:                                                                                      

16:47  / 84; Pulse 76; Resp 20; Temp 98.7; Pulse Ox 99% ; Weight 99.79 kg; Height 5 ft. sv  

      2 in. (157.48 cm); Pain 6/10;                                                               

16:47 Body Mass Index 40.24 (99.79 kg, 157.48 cm)                                             sv  

                                                                                                  

ED Course:                                                                                        

16:32 Patient arrived in ED.                                                                  mr  

16:32 Lio Laguna DO is Private Physician.                                                 mr  

16:45 Triage completed.                                                                       sv  

16:47 Arm band placed on Patient placed in an exam room.                                      sv  

16:49 [facility]                                                        snw 

16:49 Ephraim Fry MD is Attending Physician.                                             snw 

16:54 Raya Moran, RN is Primary Nurse.                                                     ls4 

16:55 No provider procedures requiring assistance completed.                                  ls4 

16:56 Patient has correct armband on for positive identification. Bed in low position. Side   ls4 

      rails up X 1.                                                                               

17:46 Lio Laguna DO is Referral Physician.                                                nh  

17:52 Patient did not have IV access during this emergency room visit.                        ss  

                                                                                                  

Administered Medications:                                                                         

17:10 Drug: Valium 5 mg Route: PO;                                                            ls4 

17:37 Follow up: Response: No adverse reaction; Marked relief of symptoms                     ls4 

17:10 Drug: TORadol 60 mg Route: IM; Site: left deltoid;                                      ls4 

17:37 Follow up: Response: No adverse reaction                                                ls4 

17:37 Follow up: Response: Marked relief of symptoms                                          ls4 

                                                                                                  

                                                                                                  

Outcome:                                                                                          

17:46 Discharge ordered by MD.                                                                nh  

17:52 Discharged to home ambulatory, with significant other.                                  ss  

17:52 Condition: good                                                                             

17:52 Discharge instructions given to patient, family, Instructed on discharge instructions,      

      follow up and referral plans. medication usage, Demonstrated understanding of               

      instructions, follow-up care, medications, Prescriptions given X 2.                         

17:53 Patient left the ED.                                                                      

                                                                                                  

Signatures:                                                                                       

Alaina Kimbrough RN                    RN                                                      

Radha Liriano FNP-C                 FNP-Dileepw                                                  

Deepika Faith FNP                        FNP  nh                                                   

RileyDorota                                 mr                                                   

Kerrie Silva RN RN                                                      

Dean, Raya, RN                       RN   ls4                                                  

                                                                                                  

Corrections: (The following items were deleted from the chart)                                    

16:48 16:47 Pulse 76bpm; Resp 20bpm; Pulse Ox 99%; Temp 98.7F; 99.79 kg; Height 5 ft. 2 in.;  sv  

      BMI: 40.2; Pain 6/10; sv                                                                    

                                                                                                  

**************************************************************************************************

## 2019-01-30 NOTE — XMS REPORT
[facility]

 Created on:2018



Patient:Lesley Jones

Sex:Female

:1987

External Reference #:[id number]





Demographics







 Address  [address] [place] [address]

 

 Phone  [phone]

 

 Preferred Language  en

 

 Marital Status  Unknown

 

 Mu-ism Affiliation  Unknown

 

 Race  

 

 Ethnic Group  Unknown









Author







 Organization  [employer]









Care Team Providers







 Name  Role  Phone

 

 Jase Lio  Provider Role  Unavailable









Allergies, Adverse Reactions, Alerts







 Substance  Reaction  Event Type

 

 tramadol  more anxious/no sleep  Drug Allergy

 

 Topamax  mouth break out  Drug Allergy

 

 Sudafed  makes heart race  Drug Allergy







Problems







 Problem Type  Condition  Code  Onset Dates  Condition Status

 

 Problem  Primary insomnia  F51.01    Active

 

 Problem  Hypothyroidism, unspecified type  E03.9    Active

 

 Problem  Seasonal allergic rhinitis,  J30.2    Active



   unspecified trigger      

 

 Problem  Otalgia of both ears  H92.03    Active

 

 Problem  Neck pain  M54.2    Active

 

 Problem  Essential tremor  G25.0    Active

 

 Problem  ADHD (attention deficit  F90.0    Active



   hyperactivity disorder), inattentive      



   type      

 

 Problem  Gastroesophageal reflux disease  K21.9    Active



   without esophagitis      

 

 Problem  PTSD (post-traumatic stress  F43.10    Active



   disorder)      

 

 Problem  Depression with anxiety  F41.8    Active

 

 Assessment  Seasonal allergic rhinitis,  J30.2    Active



   unspecified trigger      

 

 Assessment  PTSD (post-traumatic stress  F43.10    Active



   disorder)      

 

 Assessment  Neck pain  M54.2    Active

 

 Assessment  Essential tremor  G25.0    Active

 

 Assessment  Primary insomnia  F51.01    Active

 

 Assessment  Depression with anxiety  F41.8    Active

 

 Assessment  ADHD (attention deficit  F90.0    Active



   hyperactivity disorder), inattentive      



   type      

 

 Assessment  Hypothyroidism, unspecified type  E03.9    Active







Medications







 Medication  Code  Code  Instructions  Start  End  Status  Dosage



   System      Date  Date    

 

 Levothyroxine  NDC  41923463102  25 MCG Orally      Active  1 tablet



 Sodium      Once a day        on an



               empty



               stomach in



               the



               morning

 

 Duexis  NDC  55630379089  800-26.6 MG      Active  1 tablet



       Orally Three        



       times a day PRN        



       pain        

 

 Prozac  NDC  66759210997  40 MG Orally      Active  1 capsule



       Twice a day        in the



               morning

 

 Propranolol HCl  NDC  01107783340  20 MG Orally  Aug 16,    Active  1 tablet



       Twice a day        on an



               empty



               stomach

 

 Belsomra  NDC  01526327740  10 MG Orally      Active  1 tablet



       Once a day        at bedtime



               as needed

 

 Adderall  NDC  63427025603  10 MG Orally      Active  1 tablet



       Twice a day        in the



               morning

 

 Zantac  NDC  37340262294  150 MG Orally      Active  1 tablet



       Once a day        at bedtime







Results

No Known Results



Summary Purpose

[employer] Submission

## 2019-01-30 NOTE — XMS REPORT
[facility]

 Created on:August 3, 2018



Patient:Lesley Jones

Sex:Female

:1987

External Reference #:[id number]





Demographics







 Address  [address] [place] [address]

 

 Phone  [phone]

 

 Preferred Language  en

 

 Marital Status  Unknown

 

 Episcopal Affiliation  Unknown

 

 Race  

 

 Ethnic Group  Unknown









Author







 Organization  [employer]









Care Team Providers







 Name  Role  Phone

 

 Jase Lio  Provider Role  Unavailable









Allergies

No Known Allergies



Problems







 Problem Type  Condition  Code  Onset Dates  Condition Status

 

 Problem  Hypothyroidism, unspecified type  E03.9    Active

 

 Problem  Seasonal allergic rhinitis,  J30.2    Active



   unspecified trigger      

 

 Assessment  Hypothyroidism, unspecified type  E03.9    Active

 

 Problem  Neck pain  M54.2    Active

 

 Problem  PTSD (post-traumatic stress  F43.10    Active



   disorder)      

 

 Problem  Otalgia of both ears  H92.03    Active

 

 Problem  Primary insomnia  F51.01    Active

 

 Problem  Gastroesophageal reflux disease  K21.9    Active



   without esophagitis      

 

 Problem  Depression with anxiety  F41.8    Active

 

 Problem  ADHD (attention deficit  F90.0    Active



   hyperactivity disorder), inattentive      



   type      







Medications







 Medication  Code  Code  Instructions  Start  End  Status  Dosage



   System      Date  Date    

 

 Levothyroxine  NDC  [id number]  25 MCG Orally      Active  1 tablet



 Sodium      Once a day        on an



               empty



               stomach in



               the



               morning







Results

No Known Results



Summary Purpose

[employer] Submission

## 2019-01-30 NOTE — XMS REPORT
[facility]

 Created on:May 29, 2018



Patient:Lesley Campa

Sex:Female

:1987

External Reference #:[id number]





Demographics







 Address  [address] [place] [address]

 

 Phone  [phone]

 

 Preferred Language  en

 

 Marital Status  Unknown

 

 Evangelical Affiliation  Unknown

 

 Race  

 

 Ethnic Group   or 









Author







 Organization  [employer]









Care Team Providers







 Name  Role  Phone

 

 Lio Laguna  Provider Role  Unavailable









Allergies

No Known Allergies



Problems







 Problem Type  Condition  Code  Onset Dates  Condition Status

 

 Problem  Seasonal allergic rhinitis,  J30.2    Active



   unspecified trigger      

 

 Problem  PTSD (post-traumatic stress  F43.10    Active



   disorder)      

 

 Problem  Depression with anxiety  F41.8    Active

 

 Problem  Neck pain  M54.2    Active

 

 Problem  Gastroesophageal reflux disease  K21.9    Active



   without esophagitis      

 

 Problem  Hypothyroidism, unspecified type  E03.9    Active

 

 Problem  ADHD (attention deficit  F90.0    Active



   hyperactivity disorder), inattentive      



   type      

 

 Problem  Primary insomnia  F51.01    Active







Medications

No Known Medications



Results

No Known Results



Summary Purpose

[employer] Submission

## 2019-01-30 NOTE — XMS REPORT
[facility]

 Created on:2018



Patient:Lesley Campa

Sex:Female

:1987

External Reference #:[id number]





Demographics







 Address  [address] [place] [address]

 

 Phone  [phone]

 

 Preferred Language  en

 

 Marital Status  Unknown

 

 Zoroastrian Affiliation  Unknown

 

 Race  

 

 Ethnic Group  Unknown









Author







 Organization  [employer]









Care Team Providers







 Name  Role  Phone

 

 Malia Navarrete  Provider Role  Unavailable









Allergies, Adverse Reactions, Alerts







 Substance  Reaction  Event Type

 

 tramadol  more anxious/no sleep  Drug Allergy

 

 Topamax  mouth break out  Drug Allergy

 

 Sudafed  makes heart race  Drug Allergy







Problems







 Problem Type  Condition  Code  Onset Dates  Condition Status

 

 Problem  Hypothyroidism, unspecified type  E03.9    Active

 

 Problem  Seasonal allergic rhinitis,  J30.2    Active



   unspecified trigger      

 

 Assessment  Women's annual routine  Z01.419    Active



   gynecological examination      

 

 Assessment  Generalized abdominal pain  R10.84    Active

 

 Assessment  Vagina itching  N89.8    Active

 

 Problem  Neck pain  M54.2    Active

 

 Problem  PTSD (post-traumatic stress  F43.10    Active



   disorder)      

 

 Problem  Otalgia of both ears  H92.03    Active

 

 Problem  Primary insomnia  F51.01    Active

 

 Problem  Gastroesophageal reflux disease  K21.9    Active



   without esophagitis      

 

 Problem  Depression with anxiety  F41.8    Active

 

 Problem  ADHD (attention deficit  F90.0    Active



   hyperactivity disorder),      



   inattentive type      







Medications







 Medication  Code  Code  Instructions  Start  End  Status  Dosage



   System      Date  Date    

 

 Duexis  NDC  91661913021  800-26.6 MG      Active  1 tablet



       Orally Three        



       times a day PRN        



       pain        

 

 Prozac  NDC  54423038767  40 MG Orally      Active  1 capsule



       Twice a day        in the



               morning

 

 Ambien  NDC  08236267361  10 MG Orally      Active  1 tablet



       Once a day        at bedtime



               as needed

 

 Adderall  NDC  25464797389  10 MG Orally      Active  1 tablet



       Twice a day        in the



               morning

 

 Levothyroxine  NDC  04752606302  25 MCG Orally      Active  1 tablet



 Sodium      Once a day        on an



               empty



               stomach in



               the



               morning

 

 Zantac  NDC  25366379281  150 MG Orally      Active  1 tablet



       Once a day        at bedtime







Results







 Name  Result  Date  Reference Range  Unit  Abnormality Flag

 

 URINALYSIS AUTO W/O SCOPE          



 (95304)          

 

 ----NIT  neg  2018      

 

 ----URO  0.2  2018      

 

 ----PROTEIN  neg  2018      

 

 ----pH  6.0  2018      

 

 ----BLO  2+  2018      

 

 ----GLUCOSE  neg  2018      

 

 ----WILLI  neg  2018      

 

 ----BILIRUBIN  neg  2018      

 

 ----KETONES  neg  2018      

 

 ----SPECIFIC GRAVITY  1.030  2018      







Summary Purpose

[employer] Submission

## 2019-01-30 NOTE — XMS REPORT
[facility]

 Created on:April 10, 2018



Patient:Lesley Campa

Sex:Female

:1987

External Reference #:[id number]





Demographics







 Address  [address] [place] [address]

 

 Phone  [phone]

 

 Preferred Language  en

 

 Marital Status  Unknown

 

 Uatsdin Affiliation  Unknown

 

 Race  

 

 Ethnic Group   or 









Author







 Organization  [employer]









Care Team Providers







 Name  Role  Phone

 

 Lio Laguna  Provider Role  Unavailable









Allergies

No Known Allergies



Problems







 Problem Type  Condition  Code  Onset Dates  Condition Status

 

 Problem  Seasonal allergic rhinitis,  J30.2    Active



   unspecified trigger      

 

 Problem  PTSD (post-traumatic stress  F43.10    Active



   disorder)      

 

 Problem  Depression with anxiety  F41.8    Active

 

 Problem  Neck pain  M54.2    Active

 

 Problem  Gastroesophageal reflux disease  K21.9    Active



   without esophagitis      

 

 Problem  Hypothyroidism, unspecified type  E03.9    Active

 

 Problem  ADHD (attention deficit  F90.0    Active



   hyperactivity disorder), inattentive      



   type      

 

 Problem  Primary insomnia  F51.01    Active







Medications

No Known Medications



Results

No Known Results



Summary Purpose

[facility]

## 2019-01-30 NOTE — XMS REPORT
[facility]

 Created on:2018



Patient:Lesley Campa

Sex:Female

:1987

External Reference #:[id number]





Demographics







 Address  [address] [place] [address]

 

 Phone  [phone]

 

 Preferred Language  en

 

 Marital Status  Unknown

 

 Gnosticist Affiliation  Unknown

 

 Race  

 

 Ethnic Group   or 









Author







 Organization  [employer]









Care Team Providers







 Name  Role  Phone

 

 Lio Laguna  Provider Role  Unavailable









Allergies, Adverse Reactions, Alerts







 Substance  Reaction  Event Type

 

 tramadol  more anxious/no sleep  Drug Allergy

 

 Topamax  mouth break out  Drug Allergy

 

 Sudafed  makes heart race  Drug Allergy







Problems







 Problem Type  Condition  Code  Onset Dates  Condition Status

 

 Assessment  ADHD (attention deficit  F90.0    Active



   hyperactivity disorder), inattentive      



   type      

 

 Problem  Hypothyroidism, unspecified type  E03.9    Active

 

 Problem  Seasonal allergic rhinitis,  J30.2    Active



   unspecified trigger      

 

 Problem  Neck pain  M54.2    Active

 

 Problem  PTSD (post-traumatic stress  F43.10    Active



   disorder)      

 

 Problem  Otalgia of both ears  H92.03    Active

 

 Problem  Primary insomnia  F51.01    Active

 

 Problem  Gastroesophageal reflux disease  K21.9    Active



   without esophagitis      

 

 Problem  Depression with anxiety  F41.8    Active

 

 Problem  ADHD (attention deficit  F90.0    Active



   hyperactivity disorder), inattentive      



   type      

 

 Assessment  Otalgia of both ears  H92.03    Active

 

 Assessment  PTSD (post-traumatic stress  F43.10    Active



   disorder)      

 

 Assessment  Depression with anxiety  F41.8    Active

 

 Assessment  Neck pain  M54.2    Active

 

 Assessment  Hypothyroidism, unspecified type  E03.9    Active

 

 Assessment  Seasonal allergic rhinitis,  J30.2    Active



   unspecified trigger      

 

 Assessment  Primary insomnia  F51.01    Active







Medications







 Medication  Code  Code  Instructions  Start  End  Status  Dosage



   System      Date  Date    

 

 Prozac  NDC  19357539638  40 MG Orally      Active  1 capsule



       Twice a day        in the



               morning

 

 Levothyroxine  NDC  76811737741  25 MCG Orally      Active  1 tablet



 Sodium      Once a day        on an



               empty



               stomach in



               the



               morning

 

 Amoxicillin  NDC  95687351936  500 MG Orally    Active  1 capsule



       every 8 hrs  2018    

 

 Ambien  NDC  68749215578  10 MG Orally      Active  1 tablet



       Once a day        at bedtime



               as needed

 

 Adderall  NDC  14680196933  10 MG Orally      Active  1 tablet



       Twice a day        in the



               morning

 

 Zantac  NDC  18942725740  150 MG Orally      Active  1 tablet



       Once a day        at bedtime

 

 Duexis  NDC  30920416825  800-26.6 MG      Active  1 tablet



       Orally Three        



       times a day PRN        



       pain        







Results

No Known Results



Summary Purpose

[employer] Submission

## 2019-01-30 NOTE — ER
Nurse's Notes                                                                                     

 [facility]                                                                

Name: Lesley Jones                                                                             

Age: 31 yrs                                                                                       

Sex: Female                                                                                       

: 1987                                                                                   

MRN: [id number]                                                                                   

Arrival Date: 2019                                                                          

Time: 01:50                                                                                       

Account#: [id number]                                                                            

Bed 5                                                                                             

Private MD: Lio Laguna                                                                         

Diagnosis: Migraine;Torticollis;Muscle spasm                                                      

                                                                                                  

Presentation:                                                                                     

                                                                                             

01:59 Presenting complaint: Patient states: "I can't turn my head to the right or it hurts    jd3 

      when I do. My head has also started hurting really badly to the point where I am            

      getting nauseous.". Transition of care: patient was not received from another setting       

      of care. Onset of symptoms was 2019. Risk Assessment: Do you want to hurt       

      yourself or someone else? Patient reports no desire to harm self or others. Initial         

      Sepsis Screen: Does the patient meet any 2 criteria? No. Patient's initial sepsis           

      screen is negative. Does the patient have a suspected source of infection? No.              

      Patient's initial sepsis screen is negative. Care prior to arrival: None.                   

01:59 Method Of Arrival: Ambulatory                                                           jd3 

01:59 Acuity: KAYLA 3                                                                           jd3 

                                                                                                  

OB/GYN:                                                                                           

02:04 LMP 2019                                                                           jd3 

                                                                                                  

Historical:                                                                                       

- Allergies:                                                                                      

02:03 Sudafed (RAPID HEART RATE);                                                             jd3 

02:03 Tramadol HCl (Unable to sleep);                                                         jd3 

- Home Meds:                                                                                      

02:03 Adderall 10 mg Oral tab 1 tab 2 times per day [Active]; levothyroxine 25 mcg tab 1 tab  jd3 

      once daily [Active]; propranolol 20 mg Oral tab 1 tab 2 times per day [Active]; Lexapro     

      10 mg Oral tab 1 tab once daily [Active];                                                   

- PMHx:                                                                                           

02:03 ADD/ADHD; Anxiety; ESSENTIAL TREMORS; Hypothyroidism; Depression;                       jd3 

- PSHx:                                                                                           

02:03 ; Cholecystectomy; Lap band; Tubal ligation;                                   jd3 

                                                                                                  

- Immunization history:: Adult Immunizations up to date.                                          

- Social history:: Smoking status: Patient uses tobacco products, smokes one-half pack            

  cigarettes per day.                                                                             

- Ebola Screening: : Patient negative for fever greater than or equal to 101.5 degrees            

  Fahrenheit, and additional compatible Ebola Virus Disease symptoms.                             

- Family history:: not pertinent.                                                                 

- Hospitalizations: : No recent hospitalization is reported.                                      

                                                                                                  

                                                                                                  

Screenin:06 Abuse screen: Denies threats or abuse. Nutritional screening: No deficits noted.        jd3 

      Tuberculosis screening: No symptoms or risk factors identified. Fall Risk Ambulatory        

      Aid- None/Bed Rest/Nurse Assist (0 pts). Gait- Normal/Bed Rest/Wheelchair (0 pts)           

      Mental Status- Oriented to own ability (0 pts). Total Peter Fall Scale indicates No         

      Risk (0-24 pts).                                                                            

                                                                                                  

Assessment:                                                                                       

02:05 General: Appears in no apparent distress. uncomfortable, Behavior is calm, cooperative, jd3 

      appropriate for age. Pain: Complains of pain in head and neck Quality of pain is            

      described as sharp, throbbing. Neuro: Level of Consciousness is awake, alert, obeys         

      commands, Oriented to person, place, time, situation, Moves all extremities. Full           

      function Gait is steady, Speech is normal, Facial symmetry appears normal, Pupils are       

      PERRLA. Cardiovascular: Capillary refill < 3 seconds Patient's skin is warm and dry.        

      Respiratory: Airway is patent Respiratory effort is even, unlabored, Respiratory            

      pattern is regular, symmetrical. GI: Abdomen is round non-distended, Reports nausea.        

      : No signs and/or symptoms were reported regarding the genitourinary system. EENT: No     

      signs and/or symptoms were reported regarding the EENT system. Derm: Skin is intact,        

      Skin is dry, Skin is normal, Skin temperature is warm. Musculoskeletal: Circulation,        

      motion, and sensation intact. Range of motion: intact in all extremities.                   

02:42 Reassessment: Patient appears in no apparent distress at this time. Patient and/or      jd3 

      family updated on plan of care and expected duration. Pain level reassessed. Patient is     

      alert, oriented x 3, equal unlabored respirations, skin warm/dry/pink. awaiting IV          

      fluids to infuse before discharge.                                                          

03:27 Reassessment: Patient appears in no apparent distress at this time. Patient and/or      jd3 

      family updated on plan of care and expected duration. Pain level reassessed. Patient is     

      alert, oriented x 3, equal unlabored respirations, skin warm/dry/pink. Patient states       

      feeling better.                                                                             

                                                                                                  

Vital Signs:                                                                                      

02:04  / 76; Pulse 81; Resp 18 S; Temp 97.7(O); Pulse Ox 98% on R/A; Weight 99.79 kg    jd3 

      (R); Height 5 ft. 2 in. (157.48 cm) (R); Pain 10/10;                                        

03:27  / 73; Pulse 69; Resp 17 S; Pulse Ox 98% on R/A; Pain 3/10;                       jd3 

02:04 Body Mass Index 40.24 (99.79 kg, 157.48 cm)                                             jd3 

                                                                                                  

ED Course:                                                                                        

01:50 Patient arrived in ED.                                                                  es  

01:51 Lio Laguna DO is Private Physician.                                                 es  

01:52 Ronen Pat MD is Attending Physician.                                                rn  

02:02 Triage completed.                                                                       jd3 

02:04 Arm band placed on.                                                                     jd3 

02:06 Dion Benson RN is Primary Nurse.                                                  jd3 

02:06 Patient has correct armband on for positive identification. Bed in low position. Call   jd3 

      light in reach. Side rails up X 1.                                                          

02:15 Inserted saline lock: 20 gauge in right antecubital area, using aseptic technique.      jd3 

      Blood collected.                                                                            

02:27 No provider procedures requiring assistance completed.                                  jd3 

03:26 IV discontinued, intact, bleeding controlled, No redness/swelling at site. Pressure     jd3 

      dressing applied.                                                                           

                                                                                                  

Administered Medications:                                                                         

02:24 Drug: Reglan 10 mg Route: IVP; Site: right antecubital;                                 jd3 

03:27 Follow up: Response: No adverse reaction                                                jd3 

02:24 Drug: Decadron - Dexamethasone 10 mg Route: IVP; Site: right antecubital;               jd3 

03:27 Follow up: Response: No adverse reaction                                                jd3 

02:25 Drug: NS 0.9% 1000 ml Route: IV; Rate: 1000 ml; Site: right antecubital;                jd3 

03:28 Follow up: Response: No adverse reaction; IV Status: Completed infusion                 jd3 

02:26 Drug: Benadryl 25 mg Route: IVP; Site: right antecubital;                               jd3 

03:28 Follow up: Response: No adverse reaction                                                jd3 

                                                                                                  

                                                                                                  

Outcome:                                                                                          

02:10 Discharge ordered by MD.                                                                rn  

03:26 Discharged to home ambulatory.                                                          jd3 

03:26 Condition: stable                                                                           

03:26 Discharge instructions given to patient, Instructed on discharge instructions, follow       

      up and referral plans. medication usage, Demonstrated understanding of instructions,        

      follow-up care, medications, Prescriptions given X 2.                                       

03:28 Patient left the ED.                                                                    jd3 

                                                                                                  

Signatures:                                                                                       

Rowan Boykin Roman, MD MD rn Davies Dion, RN                    RN   jd3                                                  

                                                                                                  

Corrections: (The following items were deleted from the chart)                                    

03:28 03:27  / 73; Pulse 69bpm; Resp 17bpm; Spontaneous; Pulse Ox 98% RA; jd3           jd3 

                                                                                                  

**************************************************************************************************

## 2019-01-30 NOTE — XMS REPORT
[facility]

 Created on:2018



Patient:Lesley Campa

Sex:Female

:1987

External Reference #:[id number]





Demographics







 Address  [address] [place] [address]

 

 Phone  [phone]

 

 Preferred Language  en

 

 Marital Status  Unknown

 

 [employer] Affiliation  Unknown

 

 Race  

 

 Ethnic Group   or 









Author







 Organization  [employer]









Care Team Providers







 Name  Role  Phone

 

 Lio Laguna  Provider Role  Unavailable









Allergies

No Known Allergies



Problems







 Problem Type  Condition  Code  Onset Dates  Condition Status

 

 Assessment  Neck pain  M54.2    Active

 

 Problem  Seasonal allergic rhinitis,  J30.2    Active



   unspecified trigger      

 

 Assessment  Hypothyroidism, unspecified type  E03.9    Active

 

 Problem  PTSD (post-traumatic stress  F43.10    Active



   disorder)      

 

 Problem  Depression with anxiety  F41.8    Active

 

 Problem  Neck pain  M54.2    Active

 

 Problem  Gastroesophageal reflux disease  K21.9    Active



   without esophagitis      

 

 Problem  Hypothyroidism, unspecified type  E03.9    Active

 

 Problem  ADHD (attention deficit  F90.0    Active



   hyperactivity disorder), inattentive      



   type      

 

 Problem  Primary insomnia  F51.01    Active







Medications

No Known Medications



Results

No Known Results



Summary Purpose

[employer] Submission

## 2019-01-30 NOTE — XMS REPORT
[facility]

 Created on:2018



Patient:Lesley Jones

Sex:Female

:1987

External Reference #:[id number]





Demographics







 Address  [address] [place] [address]

 

 Phone  [phone]

 

 Preferred Language  en

 

 Marital Status  Unknown

 

 [employer] Affiliation  Unknown

 

 Race  

 

 Ethnic Group  Unknown









Author







 Organization  [employer]









Care Team Providers







 Name  Role  Phone

 

 Jase Lio  Provider Role  Unavailable









Allergies

No Known Allergies



Problems







 Problem Type  Condition  Code  Onset Dates  Condition Status

 

 Problem  Hypothyroidism, unspecified type  E03.9    Active

 

 Problem  Seasonal allergic rhinitis,  J30.2    Active



   unspecified trigger      

 

 Problem  Neck pain  M54.2    Active

 

 Problem  PTSD (post-traumatic stress  F43.10    Active



   disorder)      

 

 Problem  Otalgia of both ears  H92.03    Active

 

 Problem  Primary insomnia  F51.01    Active

 

 Problem  Gastroesophageal reflux disease  K21.9    Active



   without esophagitis      

 

 Problem  Depression with anxiety  F41.8    Active

 

 Problem  ADHD (attention deficit  F90.0    Active



   hyperactivity disorder), inattentive      



   type      







Medications

No Known Medications



Results

No Known Results



Summary Purpose

[employer] Submission

## 2019-01-30 NOTE — XMS REPORT
[facility]

 Created on:2018



Patient:Lesley Campa

Sex:Female

:1987

External Reference #:[id number]





Demographics







 Address  [address] [place] [address]

 

 Phone  [phone]

 

 Preferred Language  en

 

 Marital Status  Unknown

 

 [employer] Affiliation  Unknown

 

 Race  

 

 Ethnic Group  Unknown









Author







 Organization  [employer]









Care Team Providers







 Name  Role  Phone

 

 Malia Navarrete  Provider Role  Unavailable









Allergies

No Known Allergies



Problems







 Problem Type  Condition  Code  Onset Dates  Condition Status

 

 Problem  Hypothyroidism, unspecified type  E03.9    Active

 

 Problem  Seasonal allergic rhinitis,  J30.2    Active



   unspecified trigger      

 

 Problem  Neck pain  M54.2    Active

 

 Problem  PTSD (post-traumatic stress  F43.10    Active



   disorder)      

 

 Problem  Otalgia of both ears  H92.03    Active

 

 Problem  Primary insomnia  F51.01    Active

 

 Problem  Gastroesophageal reflux disease  K21.9    Active



   without esophagitis      

 

 Problem  Depression with anxiety  F41.8    Active

 

 Problem  ADHD (attention deficit  F90.0    Active



   hyperactivity disorder), inattentive      



   type      







Medications

No Known Medications



Results

No Known Results



Summary Purpose

[employer] Submission

## 2019-01-30 NOTE — XMS REPORT
[facility]

 Created on:2018



Patient:Lesley Campa

Sex:Female

:1987

External Reference #:[id number]





Demographics







 Address  [address] [place] [address]

 

 Phone  [phone]

 

 Preferred Language  en

 

 Marital Status  Unknown

 

 [employer] Affiliation  Unknown

 

 Race  

 

 Ethnic Group  Unknown









Author







 Organization  [employer]









Care Team Providers







 Name  Role  Phone

 

 Malia Navarrete  Provider Role  Unavailable









Allergies

No Known Allergies



Problems







 Problem Type  Condition  Code  Onset Dates  Condition Status

 

 Problem  Hypothyroidism, unspecified type  E03.9    Active

 

 Problem  Seasonal allergic rhinitis,  J30.2    Active



   unspecified trigger      

 

 Problem  Neck pain  M54.2    Active

 

 Problem  PTSD (post-traumatic stress  F43.10    Active



   disorder)      

 

 Problem  Otalgia of both ears  H92.03    Active

 

 Problem  Primary insomnia  F51.01    Active

 

 Problem  Gastroesophageal reflux disease  K21.9    Active



   without esophagitis      

 

 Problem  Depression with anxiety  F41.8    Active

 

 Problem  ADHD (attention deficit  F90.0    Active



   hyperactivity disorder), inattentive      



   type      







Medications







 Medication  Code System  Code  Instructions  Start Date  End Date  Status  
Dosage

 

 Flagyl  NDC  [id number]  500 MG Orally  ,  ,  Active  1 tablet



       Twice daily  2018    







Results

No Known Results



Summary Purpose

[employer] Submission

## 2019-01-30 NOTE — XMS REPORT
[facility]

 Created on:May 23, 2018



Patient:Lesley Campa

Sex:Female

:1987

External Reference #:[id number]





Demographics







 Address  [address] [place] [address]

 

 Phone  [phone]

 

 Preferred Language  en

 

 Marital Status  Unknown

 

 [employer] Affiliation  Unknown

 

 Race  

 

 Ethnic Group   or 









Author







 Organization  [employer]









Care Team Providers







 Name  Role  Phone

 

 Lio Laguna  Provider Role  Unavailable









Allergies, Adverse Reactions, Alerts







 Substance  Reaction  Event Type

 

 tramadol  more anxious/no sleep  Drug Allergy

 

 Topamax  mouth break out  Drug Allergy

 

 Sudafed  makes heart race  Drug Allergy







Problems







 Problem Type  Condition  Code  Onset Dates  Condition Status

 

 Assessment  Primary insomnia  F51.01    Active

 

 Problem  Seasonal allergic rhinitis,  J30.2    Active



   unspecified trigger      

 

 Assessment  ADHD (attention deficit  F90.0    Active



   hyperactivity disorder), inattentive      



   type      

 

 Problem  PTSD (post-traumatic stress  F43.10    Active



   disorder)      

 

 Problem  Depression with anxiety  F41.8    Active

 

 Problem  Neck pain  M54.2    Active

 

 Problem  Gastroesophageal reflux disease  K21.9    Active



   without esophagitis      

 

 Problem  Hypothyroidism, unspecified type  E03.9    Active

 

 Problem  ADHD (attention deficit  F90.0    Active



   hyperactivity disorder), inattentive      



   type      

 

 Problem  Primary insomnia  F51.01    Active

 

 Assessment  Seasonal allergic rhinitis,  J30.2    Active



   unspecified trigger      

 

 Assessment  PTSD (post-traumatic stress  F43.10    Active



   disorder)      

 

 Assessment  Depression with anxiety  F41.8    Active

 

 Assessment  Neck pain  M54.2    Active

 

 Assessment  Hypothyroidism, unspecified type  E03.9    Active







Medications







 Medication  Code  Code  Instructions  Start  End  Status  Dosage



   System      Date  Date    

 

 Prozac  NDC  [id number]  40 MG Orally      Active  1 capsule



       Once a day        in the



               morning

 

 Duexis  NDC  [id number]  800-26.6 MG  May 22,  July  Active  1 tablet



       Orally Three    21,    



       times a day PRN        



       pain        

 

 Ambien  NDC  [id number]  10 MG Orally      Active  1 tablet



       Once a day        at bedtime



               as needed

 

 Adderall  NDC  [id number]  10 MG Orally      Active  1 tablet



       Twice a day        in the



               morning

 

 Levothyroxine  NDC  [id number]  25 MCG Orally      Active  1 tablet



 Sodium      Once a day        on an



               empty



               stomach in



               the



               morning

 

 Zantac  NDC  [id number]  150 MG Orally      Active  1 tablet



       Once a day        at bedtime







Results

No Known Results



Summary Purpose

[employer] Submission

## 2019-01-30 NOTE — XMS REPORT
[facility]

 Created on:2018



Patient:Lesley Jones

Sex:Female

:1987

External Reference #:[id number]





Demographics







 Address  [address] [place] [address]

 

 Phone  [phone]

 

 Preferred Language  en

 

 Marital Status  Unknown

 

 Judaism Affiliation  Unknown

 

 Race  

 

 Ethnic Group  Unknown









Author







 Organization  [employer]









Care Team Providers







 Name  Role  Phone

 

 Lio Laguna  Provider Role  Unavailable









Allergies

No Known Allergies



Problems







 Problem Type  Condition  Code  Onset Dates  Condition Status

 

 Problem  Hypothyroidism, unspecified type  E03.9    Active

 

 Problem  Seasonal allergic rhinitis,  J30.2    Active



   unspecified trigger      

 

 Problem  Neck pain  M54.2    Active

 

 Problem  PTSD (post-traumatic stress  F43.10    Active



   disorder)      

 

 Problem  Otalgia of both ears  H92.03    Active

 

 Problem  Primary insomnia  F51.01    Active

 

 Problem  Gastroesophageal reflux disease  K21.9    Active



   without esophagitis      

 

 Problem  Depression with anxiety  F41.8    Active

 

 Problem  ADHD (attention deficit  F90.0    Active



   hyperactivity disorder), inattentive      



   type      

 

 Assessment  Otalgia of both ears  H92.03    Active

 

 Assessment  Neck pain  M54.2    Active

 

 Assessment  Depression with anxiety  F41.8    Active

 

 Assessment  Hypothyroidism, unspecified type  E03.9    Active

 

 Assessment  Seasonal allergic rhinitis,  J30.2    Active



   unspecified trigger      

 

 Assessment  Primary insomnia  F51.01    Active

 

 Assessment  PTSD (post-traumatic stress  F43.10    Active



   disorder)      

 

 Assessment  ADHD (attention deficit  F90.0    Active



   hyperactivity disorder), inattentive      



   type      







Medications







 Medication  Code  Code  Instructions  Start  End  Status  Dosage



   System      Date  Date    

 

 Belsomra  NDC  17219201455  10 MG Orally  ,    Active  1 tablet



       Once a day        at



               bedtime



               as needed

 

 Adderall  NDC  35731056963  10 MG Orally      Active  1 tablet



       Twice a day        in the



               morning

 

 Zantac  NDC  82260499307  150 MG Orally      Active  1 tablet



       Once a day        at



               bedtime

 

 Prozac  NDC  62096078816  40 MG Orally      Active  1 capsule



       Twice a day        in the



               morning

 

 Levothyroxine  NDC  63716857660  25 MCG Orally      Active  1 tablet



 Sodium      Once a day        on an



               empty



               stomach



               in the



               morning

 

 Duexis  NDC  67102496647  800-26.6 MG      Active  1 tablet



       Orally Three        



       times a day PRN        



       pain        

 

 Ambien  NDC  52820352889  10 MG Orally      Inactive  1 tablet



       Once a day        at



               bedtime



               as needed







Results

No Known Results



Summary Purpose

[employer]Clinical[employer] Submission

## 2019-01-30 NOTE — EDPHYS
Physician Documentation                                                                           

 [facility]                                                                

Name: Lesley Jones                                                                             

Age: 31 yrs                                                                                       

Sex: Female                                                                                       

: 1987                                                                                   

MRN: [id number]                                                                                   

Arrival Date: 2019                                                                          

Time: 01:50                                                                                       

Account#: [id number]                                                                            

Bed 5                                                                                             

Private MD: Juan Lagunah                                                                         

ED Physician Ronen Pat                                                                         

HPI:                                                                                              

                                                                                             

02:02 This 31 yrs old  Female presents to ER via Ambulatory with complaints of Stiff rn  

      Neck, Nausea, Blurred Vision.                                                               

02:02 The patient or guardian complains of pain. The symptoms are located on the right        rn  

      posterior lateral neck. Onset: The symptoms/episode began/occurred last night.              

      Associated signs and symptoms: The patient has no apparent associated signs or              

      symptoms, Pertinent negatives: fever, bladder incontinence, bowel incontinence,             

      numbness, tingling, weakness. The pain does not radiate. Modifying factors: The             

      symptoms are alleviated by remaining still, the symptoms are aggravated by movement.        

      Severity of symptoms: At their worst the symptoms were moderate, in the emergency           

      department the symptoms are unchanged. The patient has experienced similar episodes in      

      the past. 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       

      hurts only when turns head to the right, denies fever/vomiting/chest pain/abd pain. NO      

      focal neurological deficits. Reports long history of migraines and thinks neck pain         

      triggered a migraine..                                                                      

                                                                                                  

OB/GYN:                                                                                           

02:04 LMP 2019                                                                           jd3 

                                                                                                  

Historical:                                                                                       

- Allergies:                                                                                      

02:03 Sudafed (RAPID HEART RATE);                                                             jd3 

02:03 Tramadol HCl (Unable to sleep);                                                         jd3 

- Home Meds:                                                                                      

02:03 Adderall 10 mg Oral tab 1 tab 2 times per day [Active]; levothyroxine 25 mcg tab 1 tab  jd3 

      once daily [Active]; propranolol 20 mg Oral tab 1 tab 2 times per day [Active]; Lexapro     

      10 mg Oral tab 1 tab once daily [Active];                                                   

- PMHx:                                                                                           

02:03 ADD/ADHD; Anxiety; ESSENTIAL TREMORS; Hypothyroidism; Depression;                       jd3 

- PSHx:                                                                                           

02:03 ; Cholecystectomy; Lap band; Tubal ligation;                                   jd3 

                                                                                                  

- Immunization history:: Adult Immunizations up to date.                                          

- Social history:: Smoking status: Patient uses tobacco products, smokes one-half pack            

  cigarettes per day.                                                                             

- Ebola Screening: : Patient negative for fever greater than or equal to 101.5 degrees            

  Fahrenheit, and additional compatible Ebola Virus Disease symptoms.                             

- Family history:: not pertinent.                                                                 

- Hospitalizations: : No recent hospitalization is reported.                                      

                                                                                                  

                                                                                                  

ROS:                                                                                              

02:02 Constitutional: Negative for fever, chills, and weight loss, Eyes: Negative for injury, rn  

      pain, redness, and discharge, Neck: Negative for injury and swelling, Cardiovascular:       

      Negative for chest pain, palpitations, and edema, Respiratory: Negative for shortness       

      of breath, cough, wheezing, and pleuritic chest pain, Abdomen/GI: Negative for              

      abdominal pain, vomiting, diarrhea, and constipation, MS/Extremity: Negative for injury     

      and deformity, Skin: Negative for injury, rash, and discoloration, Neuro: Negative for      

      weakness, numbness, tingling, and seizure.                                                  

                                                                                                  

Exam:                                                                                             

02:02 Constitutional:  This is a well developed, well nourished patient who is awake, alert,  rn  

      and in no acute distress. Walked to room without difficulty Head/Face:  Normocephalic,      

      atraumatic. Eyes:  Pupils equal round and reactive to light, extra-ocular motions           

      intact.  Lids and lashes normal.  Conjunctiva and sclera are non-icteric and not            

      injected.  Cornea within normal limits.  Periorbital areas with no swelling, redness,       

      or edema. ENT:  MMM Neck:  Trachea midline, no thyromegaly or masses palpated, and no       

      cervical lymphadenopathy.  Supple, full range of motion without nuchal rigidity, or         

      vertebral point tenderness.  No Meningismus. + painful ROM along right SCM/trapezius        

      with turning to right Skin:  Warm, dry with normal turgor.  Normal color with no            

      rashes, no lesions, and no evidence of cellulitis. MS/ Extremity:  Pulses equal, no         

      cyanosis.  Neurovascular intact.  Full, normal range of motion.  Equal circumference.       

      Neuro:  Awake and alert, GCS 15, oriented to person, place, time, and situation.            

      Cranial nerves II-XII grossly intact.  Motor strength 5/5 in all extremities.  Sensory      

      grossly intact.  Cerebellar exam normal.                                                    

                                                                                                  

Vital Signs:                                                                                      

02:04  / 76; Pulse 81; Resp 18 S; Temp 97.7(O); Pulse Ox 98% on R/A; Weight 99.79 kg    jd3 

      (R); Height 5 ft. 2 in. (157.48 cm) (R); Pain 10/10;                                        

03:27  / 73; Pulse 69; Resp 17 S; Pulse Ox 98% on R/A; Pain 3/10;                       jd3 

02:04 Body Mass Index 40.24 (99.79 kg, 157.48 cm)                                             jd3 

                                                                                                  

MDM:                                                                                              

01:52 Patient medically screened.                                                             rn  

02:02 Differential diagnosis: cervical strain, torticollis, migraine. Data reviewed: vital    rn  

      signs, nurses notes.                                                                        

                                                                                                  

                                                                                             

02:01 Order name: IV Start; Complete Time: 02:23                                              rn  

                                                                                                  

Administered Medications:                                                                         

02:24 Drug: Reglan 10 mg Route: IVP; Site: right antecubital;                                 jd3 

03:27 Follow up: Response: No adverse reaction                                                jd3 

02:24 Drug: Decadron - Dexamethasone 10 mg Route: IVP; Site: right antecubital;               jd3 

03:27 Follow up: Response: No adverse reaction                                                jd3 

02:25 Drug: NS 0.9% 1000 ml Route: IV; Rate: 1000 ml; Site: right antecubital;                jd3 

03:28 Follow up: Response: No adverse reaction; IV Status: Completed infusion                 jd3 

02:26 Drug: Benadryl 25 mg Route: IVP; Site: right antecubital;                               jd3 

03:28 Follow up: Response: No adverse reaction                                                jd3 

                                                                                                  

                                                                                                  

Disposition:                                                                                      

19 02:10 Discharged to Home. Impression: Migraine, Torticollis, Muscle spasm.               

- Condition is Stable.                                                                            

- Discharge Instructions: Migraine Headache, Acute Torticollis, Adult, Neck Exercises.            

- Prescriptions for Cyclobenzaprine 10 mg Oral Tablet - take 1 tablet by ORAL route               

  every 8 hours As needed; 30 tablet. Medrol (Mike) 4 mg Oral Tablets, Dose Pack - take            

  1 tablet by ORAL route as directed - follow package instructions; 1 packet.                     

- Medication Reconciliation Form, Thank You Letter, Antibiotic Education, Prescription            

  Opioid Use form.                                                                                

- Follow up: Private Physician; When: As needed; Reason: Recheck today's complaints,              

  Re-evaluation by your physician.                                                                

- Problem is new.                                                                                 

- Symptoms have improved.                                                                         

                                                                                                  

                                                                                                  

                                                                                                  

Signatures:                                                                                       

Ronen Pat MD MD rn Davies, Jonathon, RN RN   jseth                                                  

                                                                                                  

Corrections: (The following items were deleted from the chart)                                    

03:28 02:10 2019 02:10 Discharged to Home. Impression: Migraine; Torticollis; Muscle    jd3 

      spasm. Condition is Stable. Discharge Instructions: Migraine Headache, Acute                

      Torticollis, Adult, Neck Exercises. Prescriptions for Cyclobenzaprine 10 mg Oral Tablet     

      - take 1 tablet by ORAL route every 8 hours As needed; 30 tablet, Medrol (Mike) 4 mg         

      Oral Tablets, Dose Pack - take 1 tablet by ORAL route as directed - follow package          

      instructions; 1 packet. and Forms are Medication Reconciliation Form, Thank You Letter,     

      Antibiotic Education, Prescription Opioid Use. Follow up: Private Physician; When: As       

      needed; Reason: Recheck today's complaints, Re-evaluation by your physician. Problem is     

      new. Symptoms have improved. rn                                                             

                                                                                                  

**************************************************************************************************

## 2019-01-30 NOTE — XMS REPORT
[facility]

 Created on:2018



Patient:Lesley Jones

Sex:Female

:1987

External Reference #:[id number]





Demographics







 Address  [address] [place] [address]

 

 Phone  [phone]

 

 Preferred Language  en

 

 Marital Status  Unknown

 

 Ep[employer] Affiliation  Unknown

 

 Race  

 

 Ethnic Group  Unknown









Author







 Organization  [employer]









Care Team Providers







 Name  Role  Phone

 

 Jase Lio  Provider Role  Unavailable









Allergies, Adverse Reactions, Alerts







 Substance  Reaction  Event Type

 

 tramadol  more anxious/no sleep  Drug Allergy

 

 Topamax  mouth break out  Drug Allergy

 

 Sudafed  makes heart race  Drug Allergy







Problems







 Problem Type  Condition  Code  Onset Dates  Condition Status

 

 Problem  Primary insomnia  F51.01    Active

 

 Problem  Hypothyroidism, unspecified type  E03.9    Active

 

 Problem  Seasonal allergic rhinitis,  J30.2    Active



   unspecified trigger      

 

 Problem  Otalgia of both ears  H92.03    Active

 

 Problem  Neck pain  M54.2    Active

 

 Problem  Essential tremor  G25.0    Active

 

 Problem  ADHD (attention deficit  F90.0    Active



   hyperactivity disorder), inattentive      



   type      

 

 Problem  Gastroesophageal reflux disease  K21.9    Active



   without esophagitis      

 

 Problem  PTSD (post-traumatic stress  F43.10    Active



   disorder)      

 

 Problem  Depression with anxiety  F41.8    Active

 

 Assessment  Seasonal allergic rhinitis,  J30.2    Active



   unspecified trigger      

 

 Assessment  PTSD (post-traumatic stress  F43.10    Active



   disorder)      

 

 Assessment  Essential tremor  G25.0    Active

 

 Assessment  Primary insomnia  F51.01    Active

 

 Assessment  Depression with anxiety  F41.8    Active

 

 Assessment  ADHD (attention deficit  F90.0    Active



   hyperactivity disorder), inattentive      



   type      

 

 Assessment  Hypothyroidism, unspecified type  E03.9    Active







Medications







 Medication  Code  Code  Instructions  Start  End  Status  Dosage



   System      Date  Date    

 

 Prozac  NDC  88637947015  40 MG Orally      Active  1 capsule



       Twice a day        in the



               morning

 

 Levothyroxine  NDC  37896251390  25 MCG Orally      Active  1 tablet



 Sodium      Once a day        on an



               empty



               stomach in



               the



               morning

 

 Adderall  NDC  08061989303  10 MG Orally      Active  1 tablet



       Twice a day        in the



               morning

 

 Propranolol HCl  NDC  88635442261  20 MG Orally      Active  1 tablet



       Twice a day        on an



               empty



               stomach

 

 Belsomra  NDC  71861068004  10 MG Orally      Active  1 tablet



       Once a day        at bedtime



               as needed

 

 Zantac  NDC  08279792391  150 MG Orally      Active  1 tablet



       Once a day        at bedtime

 

 Duexis  NDC  47763553570  800-26.6 MG      Active  1 tablet



       Orally Three        



       times a day PRN        



       pain        







Results

No Known Results



Summary Purpose

[employer]ClinicalWorks Submission

## 2019-01-30 NOTE — XMS REPORT
[facility]

 Created on:2019



Patient:Lesley Jones

Sex:Female

:1987

External Reference #:[id number]





Demographics







 Address  [address] [place] [address]

 

 Phone  [phone]

 

 Preferred Language  en

 

 Marital Status  Unknown

 

 Mu-ism Affiliation  Unknown

 

 Race  

 

 Ethnic Group  Unknown









Author







 Organization  [employer]









Care Team Providers







 Name  Role  Phone

 

 Jase Lio  Provider Role  Unavailable









Allergies, Adverse Reactions, Alerts







 Substance  Reaction  Event Type

 

 tramadol  more anxious/no sleep  Drug Allergy

 

 Topamax  mouth break out  Drug Allergy

 

 Sudafed  makes heart race  Drug Allergy







Problems







 Problem Type  Condition  Code  Onset Dates  Condition Status

 

 Problem  Primary insomnia  F51.01    Active

 

 Problem  Hypothyroidism, unspecified type  E03.9    Active

 

 Problem  Seasonal allergic rhinitis,  J30.2    Active



   unspecified trigger      

 

 Problem  Otalgia of both ears  H92.03    Active

 

 Problem  Neck pain  M54.2    Active

 

 Problem  Essential tremor  G25.0    Active

 

 Problem  ADHD (attention deficit  F90.0    Active



   hyperactivity disorder), inattentive      



   type      

 

 Problem  Gastroesophageal reflux disease  K21.9    Active



   without esophagitis      

 

 Problem  PTSD (post-traumatic stress  F43.10    Active



   disorder)      

 

 Problem  Depression with anxiety  F41.8    Active

 

 Assessment  Seasonal allergic rhinitis,  J30.2    Active



   unspecified trigger      

 

 Assessment  PTSD (post-traumatic stress  F43.10    Active



   disorder)      

 

 Assessment  Essential tremor  G25.0    Active

 

 Assessment  Primary insomnia  F51.01    Active

 

 Assessment  Depression with anxiety  F41.8    Active

 

 Assessment  ADHD (attention deficit  F90.0    Active



   hyperactivity disorder), inattentive      



   type      

 

 Assessment  Hypothyroidism, unspecified type  E03.9    Active







Medications







 Medication  Code  Code  Instructions  Start  End  Status  Dosage



   System      Date  Date    

 

 Zantac  NDC  66109456663  150 MG Orally      Active  1 tablet



       Once a day        at bedtime

 

 Adderall  NDC  51285719074  10 MG Orally  ,    Active  1 tablet



       Twice a day  2019      in the



               morning

 

 Levothyroxine  NDC  14743000346  25 MCG Orally      Active  1 tablet



 Sodium      Once a day        on an



               empty



               stomach in



               the



               morning

 

 Duexis  NDC  20652104243  800-26.6 MG      Active  1 tablet



       Orally Three        



       times a day PRN        



       pain        

 

 Escitalopram  NDC  64795751430  10 MG Orally      Active  1 tablet



 Oxalate      Once a day        

 

 Belsomra  NDC  83069908990  10 MG Orally      Active  1 tablet



       Once a day        at bedtime



               as needed

 

 Propranolol HCl  NDC  34016159001  20 MG Orally      Active  1 tablet



       Twice a day        on an



               empty



               stomach







Results

No Known Results



Summary Purpose

eClinicalWorks Submission

## 2019-01-30 NOTE — XMS REPORT
[facility]

 Created on:May 4, 2018



Patient:Lesley Campa

Sex:Female

:1987

External Reference #:[id number]





Demographics







 Address  [address] [place] [address]

 

 Phone  [phone]

 

 Preferred Language  en

 

 Marital Status  Unknown

 

 Mandaen Affiliation  Unknown

 

 Race  

 

 Ethnic Group   or 









Author







 Organization  eClinicalWorks









Care Team Providers







 Name  Role  Phone

 

 Lio Laguna  Provider Role  Unavailable









Allergies

No Known Allergies



Problems







 Problem Type  Condition  Code  Onset Dates  Condition Status

 

 Problem  Seasonal allergic rhinitis,  J30.2    Active



   unspecified trigger      

 

 Assessment  Depression with anxiety  F41.8    Active

 

 Problem  PTSD (post-traumatic stress  F43.10    Active



   disorder)      

 

 Problem  Depression with anxiety  F41.8    Active

 

 Problem  Neck pain  M54.2    Active

 

 Problem  Gastroesophageal reflux disease  K21.9    Active



   without esophagitis      

 

 Problem  Hypothyroidism, unspecified type  E03.9    Active

 

 Problem  ADHD (attention deficit  F90.0    Active



   hyperactivity disorder), inattentive      



   type      

 

 Problem  Primary insomnia  F51.01    Active







Medications







 Medication  Code System  Code  Instructions  Start  End Date  Status  Dosage



         Date      

 

 Prozac  NDC  [id number]  40 MG Orally      Active  1 capsule



       Once a day        in the



               morning







Results

No Known Results



Summary Purpose

eClinicalWorks Submission

## 2019-01-30 NOTE — XMS REPORT
[facility]

 Created on:2018



Patient:Lesley Campa

Sex:Female

:1987

External Reference #:[id number]





Demographics







 Address  [address] [place] [address]

 

 Phone  [phone]

 

 Preferred Language  en

 

 Marital Status  Unknown

 

 Alevism Affiliation  Unknown

 

 Race  

 

 Ethnic Group   or 









Author







 Organization  [employer]









Care Team Providers







 Name  Role  Phone

 

 Lio Laguna  Provider Role  Unavailable









Allergies

No Known Allergies



Problems







 Problem Type  Condition  Code  Onset Dates  Condition Status

 

 Problem  Seasonal allergic rhinitis,  J30.2    Active



   unspecified trigger      

 

 Problem  PTSD (post-traumatic stress  F43.10    Active



   disorder)      

 

 Problem  Depression with anxiety  F41.8    Active

 

 Problem  Neck pain  M54.2    Active

 

 Problem  Gastroesophageal reflux disease  K21.9    Active



   without esophagitis      

 

 Problem  Hypothyroidism, unspecified type  E03.9    Active

 

 Problem  ADHD (attention deficit  F90.0    Active



   hyperactivity disorder), inattentive      



   type      

 

 Problem  Primary insomnia  F51.01    Active







Medications

No Known Medications



Results

No Known Results



Summary Purpose

[employer] Submission

## 2019-01-30 NOTE — XMS REPORT
[facility]

 Created on:2018



Patient:Lesley Campa

Sex:Female

:1987

External Reference #:[id number]





Demographics







 Address  [address] [place] [address]

 

 Phone  [phone]

 

 Preferred Language  en

 

 Marital Status  Unknown

 

 Voodoo Affiliation  Unknown

 

 Race  

 

 Ethnic Group   or 









Author







 Organization  e[employer]Works









Care Team Providers







 Name  Role  Phone

 

 LagunaLio  Provider Role  Unavailable









Allergies

No Known Allergies



Problems







 Problem Type  Condition  Code  Onset Dates  Condition Status

 

 Assessment  Primary insomnia  F51.01    Active

 

 Problem  Seasonal allergic rhinitis,  J30.2    Active



   unspecified trigger      

 

 Assessment  ADHD (attention deficit  F90.0    Active



   hyperactivity disorder), inattentive      



   type      

 

 Problem  PTSD (post-traumatic stress  F43.10    Active



   disorder)      

 

 Problem  Depression with anxiety  F41.8    Active

 

 Problem  Neck pain  M54.2    Active

 

 Problem  Gastroesophageal reflux disease  K21.9    Active



   without esophagitis      

 

 Problem  Hypothyroidism, unspecified type  E03.9    Active

 

 Problem  ADHD (attention deficit  F90.0    Active



   hyperactivity disorder), inattentive      



   type      

 

 Problem  Primary insomnia  F51.01    Active

 

 Assessment  Seasonal allergic rhinitis,  J30.2    Active



   unspecified trigger      

 

 Assessment  PTSD (post-traumatic stress  F43.10    Active



   disorder)      

 

 Assessment  Depression with anxiety  F41.8    Active

 

 Assessment  Neck pain  M54.2    Active

 

 Assessment  Hypothyroidism, unspecified type  E03.9    Active







Medications







 Medication  Code  Code  Instructions  Start  End  Status  Dosage



   System      Date  Date    

 

 Levothyroxine  NDC  [id number]  25 MCG Orally      Active  1 tablet



 Sodium      Once a day        on an



               empty



               stomach in



               the



               morning

 

 Prozac  NDC  42904335743  40 MG Orally      Active  1 capsule



       Once a day        in the



               morning

 

 Zantac  NDC  26495846100  150 MG Orally      Active  1 tablet



       Once a day        at bedtime

 

 Adderall  NDC  [id number]  10 MG Orally      Active  1 tablet



       Twice a day        in the



               morning

 

 Ambien  NDC  15079252905  10 MG Orally      Active  1 tablet



       Once a day        at bedtime



               as needed







Results

No Known Results



Summary Purpose

eClinicalWorks Submission

## 2019-01-30 NOTE — XMS REPORT
[facility]

 Created on:2018



Patient:Lesley Campa

Sex:Female

:1987

External Reference #:[id number]





Demographics







 Address  [address] [place] [address]

 

 Phone  [phone]

 

 Preferred Language  en

 

 Marital Status  Unknown

 

 Orthodox Affiliation  Unknown

 

 Race  

 

 Ethnic Group  Unknown









Author







 Organization  eClinicalWorks









Care Team Providers







 Name  Role  Phone

 

 Malia Navarrete  Provider Role  Unavailable









Allergies

No Known Allergies



Problems







 Problem Type  Condition  Code  Onset Dates  Condition Status

 

 Problem  Hypothyroidism, unspecified type  E03.9    Active

 

 Problem  Seasonal allergic rhinitis,  J30.2    Active



   unspecified trigger      

 

 Problem  Neck pain  M54.2    Active

 

 Problem  PTSD (post-traumatic stress  F43.10    Active



   disorder)      

 

 Problem  Otalgia of both ears  H92.03    Active

 

 Problem  Primary insomnia  F51.01    Active

 

 Problem  Gastroesophageal reflux disease  K21.9    Active



   without esophagitis      

 

 Problem  Depression with anxiety  F41.8    Active

 

 Problem  ADHD (attention deficit  F90.0    Active



   hyperactivity disorder), inattentive      



   type      







Medications

No Known Medications



Results

No Known Results



Summary Purpose

eClinicalWorks Submission

## 2019-01-30 NOTE — XMS REPORT
[facility]

 Created on:August 15, 2018



Patient:Lesley Jones

Sex:Female

:1987

External Reference #:[id number]





Demographics







 Address  [address] [place] [address]

 

 Phone  [phone]

 

 Preferred Language  en

 

 Marital Status  Unknown

 

 Mandaeism Affiliation  Unknown

 

 Race  

 

 Ethnic Group  Unknown









Author







 Organization  eClinicalWorks









Care Team Providers







 Name  Role  Phone

 

 Jase Lio  Provider Role  Unavailable









Allergies

No Known Allergies



Problems







 Problem Type  Condition  Code  Onset Dates  Condition Status

 

 Problem  Hypothyroidism, unspecified type  E03.9    Active

 

 Problem  Seasonal allergic rhinitis,  J30.2    Active



   unspecified trigger      

 

 Problem  Neck pain  M54.2    Active

 

 Problem  PTSD (post-traumatic stress  F43.10    Active



   disorder)      

 

 Problem  Otalgia of both ears  H92.03    Active

 

 Problem  Primary insomnia  F51.01    Active

 

 Problem  Gastroesophageal reflux disease  K21.9    Active



   without esophagitis      

 

 Problem  Depression with anxiety  F41.8    Active

 

 Problem  ADHD (attention deficit  F90.0    Active



   hyperactivity disorder), inattentive      



   type      







Medications

No Known Medications



Results

No Known Results



Summary Purpose

eClinicalWorks Submission

## 2019-02-06 NOTE — XMS REPORT
[facility]

 Created on:2018



Patient:Lesley Campa

Sex:Female

:1987

External Reference #:[id number]





Demographics







 Address  [address] [place] [address]

 

 Phone  [phone]

 

 Preferred Language  en

 

 Marital Status  Unknown

 

 Rastafari Affiliation  Unknown

 

 Race  

 

 Ethnic Group  Unknown









Author







 Organization  [employer]ClinicalWorks









Care Team Providers







 Name  Role  Phone

 

 Malia Navarrete  Provider Role  Unavailable









Allergies

No Known Allergies



Problems







 Problem Type  Condition  Code  Onset Dates  Condition Status

 

 Problem  Hypothyroidism, unspecified type  E03.9    Active

 

 Problem  Seasonal allergic rhinitis,  J30.2    Active



   unspecified trigger      

 

 Problem  Neck pain  M54.2    Active

 

 Problem  PTSD (post-traumatic stress  F43.10    Active



   disorder)      

 

 Problem  Otalgia of both ears  H92.03    Active

 

 Problem  Primary insomnia  F51.01    Active

 

 Problem  Gastroesophageal reflux disease  K21.9    Active



   without esophagitis      

 

 Problem  Depression with anxiety  F41.8    Active

 

 Problem  ADHD (attention deficit  F90.0    Active



   hyperactivity disorder), inattentive      



   type      







Medications

No Known Medications



Results

No Known Results



Summary Purpose

eClinicalWorks Submission

## 2019-02-06 NOTE — XMS REPORT
[facility]

 Created on:August 3, 2018



Patient:Lesley Jones

Sex:Female

:1987

External Reference #:[id number]





Demographics







 Address  [address] [place] [address]

 

 Phone  [phone]

 

 Preferred Language  en

 

 Marital Status  Unknown

 

 Orthodoxy Affiliation  Unknown

 

 Race  

 

 Ethnic Group  Unknown









Author







 Organization  [employer]ClinicalWorks









Care Team Providers







 Name  Role  Phone

 

 Jase Lio  Provider Role  Unavailable









Allergies

No Known Allergies



Problems







 Problem Type  Condition  Code  Onset Dates  Condition Status

 

 Problem  Hypothyroidism, unspecified type  E03.9    Active

 

 Problem  Seasonal allergic rhinitis,  J30.2    Active



   unspecified trigger      

 

 Assessment  Hypothyroidism, unspecified type  E03.9    Active

 

 Problem  Neck pain  M54.2    Active

 

 Problem  PTSD (post-traumatic stress  F43.10    Active



   disorder)      

 

 Problem  Otalgia of both ears  H92.03    Active

 

 Problem  Primary insomnia  F51.01    Active

 

 Problem  Gastroesophageal reflux disease  K21.9    Active



   without esophagitis      

 

 Problem  Depression with anxiety  F41.8    Active

 

 Problem  ADHD (attention deficit  F90.0    Active



   hyperactivity disorder), inattentive      



   type      







Medications







 Medication  Code  Code  Instructions  Start  End  Status  Dosage



   System      Date  Date    

 

 Levothyroxine  NDC  [id number]  25 MCG Orally      Active  1 tablet



 Sodium      Once a day        on an



               empty



               stomach in



               the



               morning







Results

No Known Results



Summary Purpose

eClinicalWorks Submission

## 2019-02-06 NOTE — XMS REPORT
[facility]

 Created on:2018



Patient:Lesley Campa

Sex:Female

:1987

External Reference #:[id number]





Demographics







 Address  [address] [place] [address]

 

 Phone  [phone]

 

 Preferred Language  en

 

 Marital Status  Unknown

 

 Hindu Affiliation  Unknown

 

 Race  

 

 Ethnic Group  Unknown









Author







 Organization  [employer]ClinicalWorks









Care Team Providers







 Name  Role  Phone

 

 Malia Navarrete  Provider Role  Unavailable









Allergies

No Known Allergies



Problems







 Problem Type  Condition  Code  Onset Dates  Condition Status

 

 Problem  Hypothyroidism, unspecified type  E03.9    Active

 

 Problem  Seasonal allergic rhinitis,  J30.2    Active



   unspecified trigger      

 

 Problem  Neck pain  M54.2    Active

 

 Problem  PTSD (post-traumatic stress  F43.10    Active



   disorder)      

 

 Problem  Otalgia of both ears  H92.03    Active

 

 Problem  Primary insomnia  F51.01    Active

 

 Problem  Gastroesophageal reflux disease  K21.9    Active



   without esophagitis      

 

 Problem  Depression with anxiety  F41.8    Active

 

 Problem  ADHD (attention deficit  F90.0    Active



   hyperactivity disorder), inattentive      



   type      







Medications







 Medication  Code System  Code  Instructions  Start Date  End Date  Status  
Dosage

 

 Flagyl  NDC  [id number]  500 MG Orally  ,  ,  Active  1 tablet



       Twice daily  2018    







Results

No Known Results



Summary Purpose

eClinicalWorks Submission

## 2019-02-06 NOTE — XMS REPORT
[facility]

 Created on:May 23, 2018



Patient:Lesley Campa

Sex:Female

:1987

External Reference #:[id number]





Demographics







 Address  [address] [place] [address]

 

 Phone  [phone]

 

 Preferred Language  en

 

 Marital Status  Unknown

 

 Zoroastrianism Affiliation  Unknown

 

 Race  

 

 Ethnic Group   or 









Author







 Organization  eClinicalWorks









Care Team Providers







 Name  Role  Phone

 

 Lio Laguna  Provider Role  Unavailable









Allergies, Adverse Reactions, Alerts







 Substance  Reaction  Event Type

 

 tramadol  more anxious/no sleep  Drug Allergy

 

 Topamax  mouth break out  Drug Allergy

 

 Sudafed  makes heart race  Drug Allergy







Problems







 Problem Type  Condition  Code  Onset Dates  Condition Status

 

 Assessment  Primary insomnia  F51.01    Active

 

 Problem  Seasonal allergic rhinitis,  J30.2    Active



   unspecified trigger      

 

 Assessment  ADHD (attention deficit  F90.0    Active



   hyperactivity disorder), inattentive      



   type      

 

 Problem  PTSD (post-traumatic stress  F43.10    Active



   disorder)      

 

 Problem  Depression with anxiety  F41.8    Active

 

 Problem  Neck pain  M54.2    Active

 

 Problem  Gastroesophageal reflux disease  K21.9    Active



   without esophagitis      

 

 Problem  Hypothyroidism, unspecified type  E03.9    Active

 

 Problem  ADHD (attention deficit  F90.0    Active



   hyperactivity disorder), inattentive      



   type      

 

 Problem  Primary insomnia  F51.01    Active

 

 Assessment  Seasonal allergic rhinitis,  J30.2    Active



   unspecified trigger      

 

 Assessment  PTSD (post-traumatic stress  F43.10    Active



   disorder)      

 

 Assessment  Depression with anxiety  F41.8    Active

 

 Assessment  Neck pain  M54.2    Active

 

 Assessment  Hypothyroidism, unspecified type  E03.9    Active







Medications







 Medication  Code  Code  Instructions  Start  End  Status  Dosage



   System      Date  Date    

 

 Prozac  NDC  [id number]  40 MG Orally      Active  1 capsule



       Once a day        in the



               morning

 

 Duexis  NDC  98787628305  800-26.6 MG  May 22,  July  Active  1 tablet



       Orally Three    21,    



       times a day PRN        



       pain        

 

 Ambien  NDC  10607684755  10 MG Orally      Active  1 tablet



       Once a day        at bedtime



               as needed

 

 Adderall  NDC  89813671570  10 MG Orally      Active  1 tablet



       Twice a day        in the



               morning

 

 Levothyroxine  NDC  99969692149  25 MCG Orally      Active  1 tablet



 Sodium      Once a day        on an



               empty



               stomach in



               the



               morning

 

 Zantac  NDC  78171383647  150 MG Orally      Active  1 tablet



       Once a day        at bedtime







Results

No Known Results



Summary Purpose

eClinicalWorks Submission

## 2019-02-06 NOTE — XMS REPORT
[facility]

 Created on:May 4, 2018



Patient:Lesley Campa

Sex:Female

:1987

External Reference #:[id number]





Demographics







 Address  [address] [place] [address]

 

 Phone  [phone]

 

 Preferred Language  en

 

 Marital Status  Unknown

 

 Gnosticist Affiliation  Unknown

 

 Race  

 

 Ethnic Group   or 









Author







 Organization  eClinicalWorks









Care Team Providers







 Name  Role  Phone

 

 Lio Laguna  Provider Role  Unavailable









Allergies

No Known Allergies



Problems







 Problem Type  Condition  Code  Onset Dates  Condition Status

 

 Problem  Seasonal allergic rhinitis,  J30.2    Active



   unspecified trigger      

 

 Assessment  Depression with anxiety  F41.8    Active

 

 Problem  PTSD (post-traumatic stress  F43.10    Active



   disorder)      

 

 Problem  Depression with anxiety  F41.8    Active

 

 Problem  Neck pain  M54.2    Active

 

 Problem  Gastroesophageal reflux disease  K21.9    Active



   without esophagitis      

 

 Problem  Hypothyroidism, unspecified type  E03.9    Active

 

 Problem  ADHD (attention deficit  F90.0    Active



   hyperactivity disorder), inattentive      



   type      

 

 Problem  Primary insomnia  F51.01    Active







Medications







 Medication  Code System  Code  Instructions  Start  End Date  Status  Dosage



         Date      

 

 Prozac  NDC  [id number]  40 MG Orally      Active  1 capsule



       Once a day        in the



               morning







Results

No Known Results



Summary Purpose

eClinicalWorks Submission

## 2019-02-06 NOTE — XMS REPORT
[facility]

 Created on:May 29, 2018



Patient:Lesley Campa

Sex:Female

:1987

External Reference #:[id number]





Demographics







 Address  [address] [place] [address]

 

 Phone  [phone]

 

 Preferred Language  en

 

 Marital Status  Unknown

 

 Church Affiliation  Unknown

 

 Race  

 

 Ethnic Group   or 









Author







 Organization  [employer]









Care Team Providers







 Name  Role  Phone

 

 Lio Laguna  Provider Role  Unavailable









Allergies

No Known Allergies



Problems







 Problem Type  Condition  Code  Onset Dates  Condition Status

 

 Problem  Seasonal allergic rhinitis,  J30.2    Active



   unspecified trigger      

 

 Problem  PTSD (post-traumatic stress  F43.10    Active



   disorder)      

 

 Problem  Depression with anxiety  F41.8    Active

 

 Problem  Neck pain  M54.2    Active

 

 Problem  Gastroesophageal reflux disease  K21.9    Active



   without esophagitis      

 

 Problem  Hypothyroidism, unspecified type  E03.9    Active

 

 Problem  ADHD (attention deficit  F90.0    Active



   hyperactivity disorder), inattentive      



   type      

 

 Problem  Primary insomnia  F51.01    Active







Medications

No Known Medications



Results

No Known Results



Summary Purpose

[employer] Submission

## 2019-02-06 NOTE — XMS REPORT
[facility]

 Created on:2018



Patient:Lesley Campa

Sex:Female

:1987

External Reference #:[id number]





Demographics







 Address  [address] [place] [address]

 

 Phone  [phone]

 

 Preferred Language  en

 

 Marital Status  Unknown

 

 Adventist Affiliation  Unknown

 

 Race  

 

 Ethnic Group   or 









Author







 Organization  [employer]ClinicalWorks









Care Team Providers







 Name  Role  Phone

 

 Lio Laguna  Provider Role  Unavailable









Allergies

No Known Allergies



Problems







 Problem Type  Condition  Code  Onset Dates  Condition Status

 

 Assessment  Neck pain  M54.2    Active

 

 Problem  Seasonal allergic rhinitis,  J30.2    Active



   unspecified trigger      

 

 Assessment  Hypothyroidism, unspecified type  E03.9    Active

 

 Problem  PTSD (post-traumatic stress  F43.10    Active



   disorder)      

 

 Problem  Depression with anxiety  F41.8    Active

 

 Problem  Neck pain  M54.2    Active

 

 Problem  Gastroesophageal reflux disease  K21.9    Active



   without esophagitis      

 

 Problem  Hypothyroidism, unspecified type  E03.9    Active

 

 Problem  ADHD (attention deficit  F90.0    Active



   hyperactivity disorder), inattentive      



   type      

 

 Problem  Primary insomnia  F51.01    Active







Medications

No Known Medications



Results

No Known Results



Summary Purpose

eClinicalWorks Submission

## 2019-02-06 NOTE — XMS REPORT
[facility]

 Created on:2018



Patient:Lesley Campa

Sex:Female

:1987

External Reference #:[id number]





Demographics







 Address  [address] [place] [address]

 

 Phone  [phone]

 

 Preferred Language  en

 

 Marital Status  Unknown

 

 Quaker Affiliation  Unknown

 

 Race  

 

 Ethnic Group   or 









Author







 Organization  [employer]ClinicalWorks









Care Team Providers







 Name  Role  Phone

 

 Lio Laguna  Provider Role  Unavailable









Allergies, Adverse Reactions, Alerts







 Substance  Reaction  Event Type

 

 tramadol  more anxious/no sleep  Drug Allergy

 

 Topamax  mouth break out  Drug Allergy

 

 Sudafed  makes heart race  Drug Allergy







Problems







 Problem Type  Condition  Code  Onset Dates  Condition Status

 

 Assessment  ADHD (attention deficit  F90.0    Active



   hyperactivity disorder), inattentive      



   type      

 

 Problem  Hypothyroidism, unspecified type  E03.9    Active

 

 Problem  Seasonal allergic rhinitis,  J30.2    Active



   unspecified trigger      

 

 Problem  Neck pain  M54.2    Active

 

 Problem  PTSD (post-traumatic stress  F43.10    Active



   disorder)      

 

 Problem  Otalgia of both ears  H92.03    Active

 

 Problem  Primary insomnia  F51.01    Active

 

 Problem  Gastroesophageal reflux disease  K21.9    Active



   without esophagitis      

 

 Problem  Depression with anxiety  F41.8    Active

 

 Problem  ADHD (attention deficit  F90.0    Active



   hyperactivity disorder), inattentive      



   type      

 

 Assessment  Otalgia of both ears  H92.03    Active

 

 Assessment  PTSD (post-traumatic stress  F43.10    Active



   disorder)      

 

 Assessment  Depression with anxiety  F41.8    Active

 

 Assessment  Neck pain  M54.2    Active

 

 Assessment  Hypothyroidism, unspecified type  E03.9    Active

 

 Assessment  Seasonal allergic rhinitis,  J30.2    Active



   unspecified trigger      

 

 Assessment  Primary insomnia  F51.01    Active







Medications







 Medication  Code  Code  Instructions  Start  End  Status  Dosage



   System      Date  Date    

 

 Prozac  NDC  [id number]  40 MG Orally      Active  1 capsule



       Twice a day        in the



               morning

 

 Levothyroxine  NDC  49504200800  25 MCG Orally      Active  1 tablet



 Sodium      Once a day        on an



               empty



               stomach in



               the



               morning

 

 Amoxicillin  NDC  03255687154  500 MG Orally    Active  1 capsule



       every 8 hrs  2018    

 

 Ambien  NDC  56949091812  10 MG Orally      Active  1 tablet



       Once a day        at bedtime



               as needed

 

 Adderall  NDC  73793251600  10 MG Orally      Active  1 tablet



       Twice a day        in the



               morning

 

 Zantac  NDC  61841177149  150 MG Orally      Active  1 tablet



       Once a day        at bedtime

 

 Duexis  NDC  98288974576  800-26.6 MG      Active  1 tablet



       Orally Three        



       times a day PRN        



       pain        







Results

No Known Results



Summary Purpose

eClinicalWorks Submission

## 2019-02-06 NOTE — XMS REPORT
[facility]

 Created on:2018



Patient:Lesley Jones

Sex:Female

:1987

External Reference #:[id number]





Demographics







 Address  [address] [place] [address]

 

 Phone  [phone]

 

 Preferred Language  en

 

 Marital Status  Unknown

 

 [employer] Affiliation  Unknown

 

 Race  

 

 Ethnic Group  Unknown









Author







 Organization  [employer]









Care Team Providers







 Name  Role  Phone

 

 Jase Lio  Provider Role  Unavailable









Allergies, Adverse Reactions, Alerts







 Substance  Reaction  Event Type

 

 tramadol  more anxious/no sleep  Drug Allergy

 

 Topamax  mouth break out  Drug Allergy

 

 Sudafed  makes heart race  Drug Allergy







Problems







 Problem Type  Condition  Code  Onset Dates  Condition Status

 

 Problem  Primary insomnia  F51.01    Active

 

 Problem  Hypothyroidism, unspecified type  E03.9    Active

 

 Problem  Seasonal allergic rhinitis,  J30.2    Active



   unspecified trigger      

 

 Problem  Otalgia of both ears  H92.03    Active

 

 Problem  Neck pain  M54.2    Active

 

 Problem  Essential tremor  G25.0    Active

 

 Problem  ADHD (attention deficit  F90.0    Active



   hyperactivity disorder), inattentive      



   type      

 

 Problem  Gastroesophageal reflux disease  K21.9    Active



   without esophagitis      

 

 Problem  PTSD (post-traumatic stress  F43.10    Active



   disorder)      

 

 Problem  Depression with anxiety  F41.8    Active

 

 Assessment  Seasonal allergic rhinitis,  J30.2    Active



   unspecified trigger      

 

 Assessment  PTSD (post-traumatic stress  F43.10    Active



   disorder)      

 

 Assessment  Essential tremor  G25.0    Active

 

 Assessment  Primary insomnia  F51.01    Active

 

 Assessment  Depression with anxiety  F41.8    Active

 

 Assessment  ADHD (attention deficit  F90.0    Active



   hyperactivity disorder), inattentive      



   type      

 

 Assessment  Hypothyroidism, unspecified type  E03.9    Active







Medications







 Medication  Code  Code  Instructions  Start  End  Status  Dosage



   System      Date  Date    

 

 Prozac  NDC  70202340469  40 MG Orally      Active  1 capsule



       Twice a day        in the



               morning

 

 Levothyroxine  NDC  [id number]  25 MCG Orally      Active  1 tablet



 Sodium      Once a day        on an



               empty



               stomach in



               the



               morning

 

 Adderall  NDC  [id number]  10 MG Orally      Active  1 tablet



       Twice a day        in the



               morning

 

 Propranolol HCl  NDC  91895432842  20 MG Orally      Active  1 tablet



       Twice a day        on an



               empty



               stomach

 

 Belsomra  NDC  66174942573  10 MG Orally      Active  1 tablet



       Once a day        at bedtime



               as needed

 

 Zantac  NDC  [id number]  150 MG Orally      Active  1 tablet



       Once a day        at bedtime

 

 Duexis  NDC  31562233935  800-26.6 MG      Active  1 tablet



       Orally Three        



       times a day PRN        



       pain        







Results

No Known Results



Summary Purpose

[employer]ClinicalWorks Submission

## 2019-02-06 NOTE — EDPHYS
Physician Documentation                                                                           

 [facility]                                                                

Name: Lesley Jones                                                                             

Age: 31 yrs                                                                                       

Sex: Female                                                                                       

: 1987                                                                                   

MRN: [id number]                                                                                   

Arrival Date: 2019                                                                          

Time: 14:01                                                                                       

Account#: [id number]                                                                            

Bed 11                                                                                            

Private MD: Juan Lagunah                                                                         

ED Physician Ronen Pat                                                                         

HPI:                                                                                              

                                                                                             

15:09 This 31 yrs old  Female presents to ER via Ambulatory with complaints of Stiff snw 

      Neck.                                                                                       

15:09 The patient or guardian complains of decreased range of motion, pain, spasm,            snw 

      tenderness. The symptoms are located on the left lateral neck. Onset: The                   

      symptoms/episode began/occurred suddenly, this morning. Context: The problem was            

      sustained at home, The neck injury/problem resulted from stretching. Associated signs       

      and symptoms: The patient has no apparent associated signs or symptoms. Location: left      

      trapezius and left scapular area. Modifying factors: The symptoms are alleviated by         

      nothing. the symptoms are aggravated by movement. Severity of symptoms: At their worst      

      the symptoms were moderate, severe. The patient has not experienced similar symptoms in     

      the past. It is unknown whether or not the patient has recently seen a physician.           

                                                                                                  

OB/GYN:                                                                                           

14:15 LMP 2019                                                                            aa5 

                                                                                                  

Historical:                                                                                       

- Allergies:                                                                                      

14:14 Sudafed (RAPID HEART RATE);                                                             aa5 

14:14 Tramadol HCl (Unable to sleep);                                                         aa5 

- PMHx:                                                                                           

14:14 ADD/ADHD; Anxiety; Depression; ESSENTIAL TREMORS; Hypothyroidism;                       aa5 

- PSHx:                                                                                           

14:14 ; Cholecystectomy; Lap band; Tubal ligation;                                   aa5 

                                                                                                  

- Immunization history:: Adult Immunizations up to date.                                          

- Social history:: Smoking status: Patient uses tobacco products, smokes one-half pack            

  cigarettes per day.                                                                             

- Ebola Screening: : No symptoms or risks identified at this time.                                

                                                                                                  

                                                                                                  

ROS:                                                                                              

15:05 Constitutional: Negative for fever, chills, and weight loss, Eyes: Negative for injury, snw 

      pain, redness, and discharge, ENT: Negative for injury, pain, and discharge,                

      Cardiovascular: Negative for chest pain, palpitations, and edema, Respiratory: Negative     

      for shortness of breath, cough, wheezing, and pleuritic chest pain, Abdomen/GI:             

      Negative for abdominal pain, nausea, vomiting, diarrhea, and constipation, Back:            

      Negative for injury and pain, : Negative for injury, bleeding, discharge, and             

      swelling, MS/Extremity: Negative for injury and deformity, Skin: Negative for injury,       

      rash, and discoloration, Neuro: Negative for headache, weakness, numbness, tingling,        

      and seizure.                                                                                

15:05 Neck: Positive for pain with movement, pain at rest, stiffness, tenderness, of the left     

      submandibular area and left sternocleidomastoid, left lateral neck.                         

                                                                                                  

Exam:                                                                                             

15:05 Head/Face:  Normocephalic, atraumatic. Eyes:  Pupils equal round and reactive to light, snw 

      extra-ocular motions intact.  Lids and lashes normal.  Conjunctiva and sclera are           

      non-icteric and not injected.  Cornea within normal limits.  Periorbital areas with no      

      swelling, redness, or edema. ENT:  Nares patent. No nasal discharge, no septal              

      abnormalities noted.  Tympanic membranes are normal and external auditory canals are        

      clear.  Oropharynx with no redness, swelling, or masses, exudates, or evidence of           

      obstruction, uvula midline.  Mucous membranes moist. Chest/axilla:  Normal chest wall       

      appearance and motion.  Nontender with no deformity.  No lesions are appreciated.           

      Cardiovascular:  Regular rate and rhythm with a normal S1 and S2.  No gallops, murmurs,     

      or rubs.  Normal PMI, no JVD.  No pulse deficits. Respiratory:  Lungs have equal breath     

      sounds bilaterally, clear to auscultation and percussion.  No rales, rhonchi or wheezes     

      noted.  No increased work of breathing, no retractions or nasal flaring. Abdomen/GI:        

      Soft, non-tender, with normal bowel sounds.  No distension or tympany.  No guarding or      

      rebound.  No evidence of tenderness throughout. Back:  No spinal tenderness.  No            

      costovertebral tenderness.  Full range of motion. Skin:  Warm, dry with normal turgor.      

      Normal color with no rashes, no lesions, and no evidence of cellulitis. MS/ Extremity:      

      Pulses equal, no cyanosis.  Neurovascular intact.  Full, normal range of motion. Neuro:     

       Awake and alert, GCS 15, oriented to person, place, time, and situation.  Cranial          

      nerves II-XII grossly intact.  Motor strength 5/5 in all extremities.  Sensory grossly      

      intact.  Cerebellar exam normal.  Normal gait. Psych:  Awake, alert, with orientation       

      to person, place and time.  Behavior, mood, and affect are within normal limits.            

15:05 Constitutional: The patient appears alert, awake, anxious, uncomfortable, crying            

15:05 Neck: External neck: tenderness, that is moderate, left lateral neck, ROM/movement:         

      pain, that is moderate, that is severe, with rotation to the right, holding head to         

      right, left sided tenderness. limited range of motion, that is moderate, when rotating      

      to the right, with flexion, with extension, Meningeal signs: are not present, nuchal        

      rigidity, is not appreciated.                                                               

                                                                                                  

Vital Signs:                                                                                      

14:15  / 72; Pulse 97; Resp 18 S; Temp 97.5(TE); Pulse Ox 98% on R/A; Weight 99.79 kg   aa5 

      (R); Height 5 ft. 2 in. (157.48 cm) (R); Pain 10/10;                                        

14:15 Body Mass Index 40.24 (99.79 kg, 157.48 cm)                                             aa5 

                                                                                                  

MDM:                                                                                              

14:17 Patient medically screened.                                                             snw 

15:09 Data reviewed: vital signs, nurses notes. Data interpreted: Pulse oximetry: on room air snw 

      is 98 %. Interpretation: normal. Counseling: I had a detailed discussion with the           

      patient and/or guardian regarding: the historical points, exam findings, and any            

      diagnostic results supporting the discharge/admit diagnosis, radiology results, the         

      need for outpatient follow up, to return to the emergency department if symptoms worsen     

      or persist or if there are any questions or concerns that arise at home. Special            

      discussion: Based on the history and exam findings, there is no indication for further      

      emergent testing or inpatient evaluation. I discussed with the patient/guardian the         

      need to see the primary care provider for further evaluation of the symptoms.               

                                                                                                  

02                                                                                             

14:35 Order name: CT Head C Spine; Complete Time: 15:05                                       snw 

02                                                                                             

14:35 Order name: Urine Pregnancy Test (obtain specimen); Complete Time: 17:26                snw 

                                                                                                  

Administered Medications:                                                                         

15:23 Drug: TORadol 60 mg Route: IM; Site: Ventrogluteal LEFT;                                iw  

15:40 Follow up: Response: No adverse reaction                                                aa5 

15:23 Drug: Flexeril 10 mg Route: PO;                                                         iw  

15:40 Follow up: Response: No adverse reaction                                                aa5 

15:23 Drug: fentaNYL (PF) 50 mcg Route: IM; Site: left deltoid;                               iw  

15:40 Follow up: Response: No adverse reaction                                                aa5 

                                                                                                  

                                                                                                  

Disposition:                                                                                      

16:39 Co-signature as Attending Physician, Ronen Pat MD.                                    rn  

                                                                                                  

Disposition:                                                                                      

19 15:08 Discharged to Home. Impression: Radiculopathy, cervical region, Muscle spasm.      

- Condition is Stable.                                                                            

- Discharge Instructions: Cervical Radiculopathy, Muscle Cramps and Spasms, Cervical              

  Sprain, Cryotherapy, Heat Therapy, Radicular Pain.                                              

- Prescriptions for Diclofenac Sodium 75 mg Oral Tablet Sustained Release - take 1                

  tablet by ORAL route 2 times per day; 30 tablet. orphenadrine citrate 100 mg Oral               

  Tablet Sustained Release - take 1 tablet by ORAL route 2 times per day As needed; 20            

  tablet.                                                                                         

- Work release form, Medication Reconciliation Form, Thank You Letter, Antibiotic                 

  Education, Prescription Opioid Use form.                                                        

- Follow up: Lio Laguna DO; When: 2 - 3 days; Reason: Recheck today's complaints,             

  Continuance of care, Re-evaluation by your physician. Follow up: Emergency                      

  Department; When: As needed; Reason: Worsening of condition.                                    

                                                                                                  

                                                                                                  

                                                                                                  

Signatures:                                                                                       

Dispatcher [employer]                           EDMS                                                 

Radha Liriano, PARAG-C                 FNP-Csnw                                                  

Ellie Vega, GISELA                     RN   Ronen Madrigal MD MD rn Calderon, Audri, RN                     RN   aa5                                                  

                                                                                                  

Corrections: (The following items were deleted from the chart)                                    

15:50 15:08 2019 15:08 Discharged to Home. Impression: Radiculopathy, cervical region;  iw  

      Muscle spasm. Condition is Stable. Forms are Medication Reconciliation Form, Thank You      

      Letter, Antibiotic Education, Prescription Opioid Use. Follow up: Lio Laguna; When: 2     

      - 3 days; Reason: Recheck today's complaints, Continuance of care, Re-evaluation by         

      your physician. Follow up: Emergency Department; When: As needed; Reason: Worsening of      

      condition. snw                                                                              

                                                                                                  

**************************************************************************************************

## 2019-02-06 NOTE — XMS REPORT
[facility]

 Created on:2018



Patient:Lesley Campa

Sex:Female

:1987

External Reference #:[id number]





Demographics







 Address  [address] [place] [address]

 

 Phone  [phone]

 

 Preferred Language  en

 

 Marital Status  Unknown

 

 Yazidi Affiliation  Unknown

 

 Race  

 

 Ethnic Group  Unknown









Author







 Organization  [employer]









Care Team Providers







 Name  Role  Phone

 

 Malia Navarrete  Provider Role  Unavailable









Allergies

No Known Allergies



Problems







 Problem Type  Condition  Code  Onset Dates  Condition Status

 

 Problem  Hypothyroidism, unspecified type  E03.9    Active

 

 Problem  Seasonal allergic rhinitis,  J30.2    Active



   unspecified trigger      

 

 Problem  Neck pain  M54.2    Active

 

 Problem  PTSD (post-traumatic stress  F43.10    Active



   disorder)      

 

 Problem  Otalgia of both ears  H92.03    Active

 

 Problem  Primary insomnia  F51.01    Active

 

 Problem  Gastroesophageal reflux disease  K21.9    Active



   without esophagitis      

 

 Problem  Depression with anxiety  F41.8    Active

 

 Problem  ADHD (attention deficit  F90.0    Active



   hyperactivity disorder), inattentive      



   type      







Medications

No Known Medications



Results

No Known Results



Summary Purpose

[employer]ClinicalWorks Submission

## 2019-02-06 NOTE — RAD REPORT
EXAM DESCRIPTION:  CT - [id number] - 2/6/2019 2:49 pm

 

CLINICAL HISTORY:  Trauma, head and neck injury.

PAIN

 

COMPARISON:  Sinus Wo Cont dated 8/7/2018

 

TECHNIQUE:  Axial 5 mm thick images of the head were obtained.

 

Axial 2 mm thick images of the cervical spine were obtained with sagittal and coronal reconstruction 
images generated and reviewed.

 

All CT scans are performed using dose optimization technique as appropriate and may include automated
 exposure control or mA/KV adjustment according to patient size.

 

FINDINGS:  CT HEAD WITHOUT CONTRAST:

 

No acute hemorrhage, hydrocephalus or extra-axial collection is identified.No areas of brain edema or
 midline shift.

 

The paranasal sinuses and mastoids are clear.The calvarium is intact.

 

CT CERVICAL SPINE WITHOUT CONTRAST:

 

No fracture or subluxation.No prevertebral soft tissues swelling is identified.

 

IMPRESSION:  No acute intracranial or cervical spine findings.

## 2019-02-06 NOTE — XMS REPORT
[facility]

 Created on:August 15, 2018



Patient:Lesley Jones

Sex:Female

:1987

External Reference #:[id number]





Demographics







 Address  [address] [place] [address]

 

 Phone  [phone]

 

 Preferred Language  en

 

 Marital Status  Unknown

 

 [employer]an Affiliation  Unknown

 

 Race  

 

 Ethnic Group  Unknown









Author







 Organization  [employer]









Care Team Providers







 Name  Role  Phone

 

 Jase Lio  Provider Role  Unavailable









Allergies

No Known Allergies



Problems







 Problem Type  Condition  Code  Onset Dates  Condition Status

 

 Problem  Hypothyroidism, unspecified type  E03.9    Active

 

 Problem  Seasonal allergic rhinitis,  J30.2    Active



   unspecified trigger      

 

 Problem  Neck pain  M54.2    Active

 

 Problem  PTSD (post-traumatic stress  F43.10    Active



   disorder)      

 

 Problem  Otalgia of both ears  H92.03    Active

 

 Problem  Primary insomnia  F51.01    Active

 

 Problem  Gastroesophageal reflux disease  K21.9    Active



   without esophagitis      

 

 Problem  Depression with anxiety  F41.8    Active

 

 Problem  ADHD (attention deficit  F90.0    Active



   hyperactivity disorder), inattentive      



   type      







Medications

No Known Medications



Results

No Known Results



Summary Purpose

eClinicalWorks Submission

## 2019-02-06 NOTE — XMS REPORT
[facility]

 Created on:2018



Patient:Lesley Jones

Sex:Female

:1987

External Reference #:[id number]





Demographics







 Address  [address] [place] [address]

 

 Phone  [phone]

 

 Preferred Language  en

 

 Marital Status  Unknown

 

 [employer] Affiliation  Unknown

 

 Race  

 

 Ethnic Group  Unknown









Author







 Organization  [employer]









Care Team Providers







 Name  Role  Phone

 

 Lio Laguna  Provider Role  Unavailable









Allergies

No Known Allergies



Problems







 Problem Type  Condition  Code  Onset Dates  Condition Status

 

 Problem  Hypothyroidism, unspecified type  E03.9    Active

 

 Problem  Seasonal allergic rhinitis,  J30.2    Active



   unspecified trigger      

 

 Problem  Neck pain  M54.2    Active

 

 Problem  PTSD (post-traumatic stress  F43.10    Active



   disorder)      

 

 Problem  Otalgia of both ears  H92.03    Active

 

 Problem  Primary insomnia  F51.01    Active

 

 Problem  Gastroesophageal reflux disease  K21.9    Active



   without esophagitis      

 

 Problem  Depression with anxiety  F41.8    Active

 

 Problem  ADHD (attention deficit  F90.0    Active



   hyperactivity disorder), inattentive      



   type      

 

 Assessment  Otalgia of both ears  H92.03    Active

 

 Assessment  Neck pain  M54.2    Active

 

 Assessment  Depression with anxiety  F41.8    Active

 

 Assessment  Hypothyroidism, unspecified type  E03.9    Active

 

 Assessment  Seasonal allergic rhinitis,  J30.2    Active



   unspecified trigger      

 

 Assessment  Primary insomnia  F51.01    Active

 

 Assessment  PTSD (post-traumatic stress  F43.10    Active



   disorder)      

 

 Assessment  ADHD (attention deficit  F90.0    Active



   hyperactivity disorder), inattentive      



   type      







Medications







 Medication  Code  Code  Instructions  Start  End  Status  Dosage



   System      Date  Date    

 

 Belsomra  NDC  13940249706  10 MG Orally  ,    Active  1 tablet



       Once a day        at



               bedtime



               as needed

 

 Adderall  NDC  12776313148  10 MG Orally      Active  1 tablet



       Twice a day        in the



               morning

 

 Zantac  NDC  10098427754  150 MG Orally      Active  1 tablet



       Once a day        at



               bedtime

 

 Prozac  NDC  89283024780  40 MG Orally      Active  1 capsule



       Twice a day        in the



               morning

 

 Levothyroxine  NDC  54153845421  25 MCG Orally      Active  1 tablet



 Sodium      Once a day        on an



               empty



               stomach



               in the



               morning

 

 Duexis  NDC  47838953865  800-26.6 MG      Active  1 tablet



       Orally Three        



       times a day PRN        



       pain        

 

 Ambien  NDC  77157506011  10 MG Orally      Inactive  1 tablet



       Once a day        at



               bedtime



               as needed







Results

No Known Results



Summary Purpose

eClinicalWorks Submission

## 2019-02-06 NOTE — XMS REPORT
[facility]

 Created on:2019



Patient:Lesley Jones

Sex:Female

:1987

External Reference #:[id number]





Demographics







 Address  [address] [place] [address]

 

 Phone  [phone]

 

 Preferred Language  en

 

 Marital Status  Unknown

 

 [employer] Affiliation  Unknown

 

 Race  

 

 Ethnic Group  Unknown









Author







 Organization  [employer]









Care Team Providers







 Name  Role  Phone

 

 Jase Lio  Provider Role  Unavailable









Allergies, Adverse Reactions, Alerts







 Substance  Reaction  Event Type

 

 tramadol  more anxious/no sleep  Drug Allergy

 

 Topamax  mouth break out  Drug Allergy

 

 Sudafed  makes heart race  Drug Allergy







Problems







 Problem Type  Condition  Code  Onset Dates  Condition Status

 

 Problem  Primary insomnia  F51.01    Active

 

 Problem  Hypothyroidism, unspecified type  E03.9    Active

 

 Problem  Seasonal allergic rhinitis,  J30.2    Active



   unspecified trigger      

 

 Problem  Otalgia of both ears  H92.03    Active

 

 Problem  Neck pain  M54.2    Active

 

 Problem  Essential tremor  G25.0    Active

 

 Problem  ADHD (attention deficit  F90.0    Active



   hyperactivity disorder), inattentive      



   type      

 

 Problem  Gastroesophageal reflux disease  K21.9    Active



   without esophagitis      

 

 Problem  PTSD (post-traumatic stress  F43.10    Active



   disorder)      

 

 Problem  Depression with anxiety  F41.8    Active

 

 Assessment  Seasonal allergic rhinitis,  J30.2    Active



   unspecified trigger      

 

 Assessment  PTSD (post-traumatic stress  F43.10    Active



   disorder)      

 

 Assessment  Essential tremor  G25.0    Active

 

 Assessment  Primary insomnia  F51.01    Active

 

 Assessment  Depression with anxiety  F41.8    Active

 

 Assessment  ADHD (attention deficit  F90.0    Active



   hyperactivity disorder), inattentive      



   type      

 

 Assessment  Hypothyroidism, unspecified type  E03.9    Active







Medications







 Medication  Code  Code  Instructions  Start  End  Status  Dosage



   System      Date  Date    

 

 Zantac  NDC  71112638238  150 MG Orally      Active  1 tablet



       Once a day        at bedtime

 

 Adderall  NDC  18733953645  10 MG Orally  ,    Active  1 tablet



       Twice a day  2019      in the



               morning

 

 Levothyroxine  NDC  41538181993  25 MCG Orally      Active  1 tablet



 Sodium      Once a day        on an



               empty



               stomach in



               the



               morning

 

 Duexis  NDC  11160423479  800-26.6 MG      Active  1 tablet



       Orally Three        



       times a day PRN        



       pain        

 

 Escitalopram  NDC  18728485807  10 MG Orally      Active  1 tablet



 Oxalate      Once a day        

 

 Belsomra  NDC  63353393292  10 MG Orally      Active  1 tablet



       Once a day        at bedtime



               as needed

 

 Propranolol HCl  NDC  80313847112  20 MG Orally      Active  1 tablet



       Twice a day        on an



               empty



               stomach







Results

No Known Results



Summary Purpose

[employer]ClinicalWorks Submission

## 2019-02-06 NOTE — ER
Nurse's Notes                                                                                     

 [facility]                                                                

Name: Lesley Jones                                                                             

Age: 31 yrs                                                                                       

Sex: Female                                                                                       

: 1987                                                                                   

MRN: [id number]                                                                                   

Arrival Date: 2019                                                                          

Time: 14:01                                                                                       

Account#: [id number]                                                                            

Bed 11                                                                                            

Private MD: Lio Laguna                                                                         

Diagnosis: Radiculopathy, cervical region;Muscle spasm                                            

                                                                                                  

Presentation:                                                                                     

                                                                                             

14:13 Presenting complaint: Patient states: "I was getting dressed and all of a sudden I got  aa5 

      a pain on the left side of my neck and upper back". pt c/o pain to left side of neck        

      and left scapular area radiating down posterior left arm. Transition of care: patient       

      was not received from another setting of care. Onset of symptoms was 2019.     

      Risk Assessment: Do you want to hurt yourself or someone else? Patient reports no           

      desire to harm self or others. Initial Sepsis Screen: Does the patient meet any 2           

      criteria? No. Patient's initial sepsis screen is negative. Does the patient have a          

      suspected source of infection? No. Patient's initial sepsis screen is negative. Care        

      prior to arrival: None.                                                                     

14:13 Method Of Arrival: Ambulatory                                                           aa5 

14:13 Acuity: KAYLA 4                                                                           aa5 

                                                                                                  

OB/GYN:                                                                                           

14:15 LMP 2019                                                                            aa5 

                                                                                                  

Historical:                                                                                       

- Allergies:                                                                                      

14:14 Sudafed (RAPID HEART RATE);                                                             aa5 

14:14 Tramadol HCl (Unable to sleep);                                                         aa5 

- PMHx:                                                                                           

14:14 ADD/ADHD; Anxiety; Depression; ESSENTIAL TREMORS; Hypothyroidism;                       aa5 

- PSHx:                                                                                           

14:14 ; Cholecystectomy; Lap band; Tubal ligation;                                   aa5 

                                                                                                  

- Immunization history:: Adult Immunizations up to date.                                          

- Social history:: Smoking status: Patient uses tobacco products, smokes one-half pack            

  cigarettes per day.                                                                             

- Ebola Screening: : No symptoms or risks identified at this time.                                

                                                                                                  

                                                                                                  

Screenin:20 Abuse screen: Denies threats or abuse. Nutritional screening: No deficits noted.        aa5 

      Tuberculosis screening: No symptoms or risk factors identified. Fall Risk None              

      identified.                                                                                 

                                                                                                  

Assessment:                                                                                       

14:30 General: Appears uncomfortable, Behavior is calm, cooperative. Pain: Complains of pain  aa5 

      in left side of neck and left scapular area Pain radiates to palmar aspect of left arm      

      Noted to be resistant to movement. Neuro: Level of Consciousness is awake, alert, obeys     

      commands, Oriented to person, place, time, situation. Cardiovascular: No deficits           

      noted. Respiratory: Airway is patent Respiratory effort is even, unlabored, Respiratory     

      pattern is regular, symmetrical. GI: No signs and/or symptoms were reported involving       

      the gastrointestinal system. : No signs and/or symptoms were reported regarding the       

      genitourinary system. EENT: No signs and/or symptoms were reported regarding the EENT       

      system. Derm: Skin is pink, warm \T\ dry. Musculoskeletal: Range of motion: intact in all   

      extremities.                                                                                

                                                                                                  

Vital Signs:                                                                                      

14:15  / 72; Pulse 97; Resp 18 S; Temp 97.5(TE); Pulse Ox 98% on R/A; Weight 99.79 kg   aa5 

      (R); Height 5 ft. 2 in. (157.48 cm) (R); Pain 10/10;                                        

14:15 Body Mass Index 40.24 (99.79 kg, 157.48 cm)                                             aa5 

                                                                                                  

ED Course:                                                                                        

14:01 Patient arrived in ED.                                                                  mr  

14:01 Lio Laguna DO is Private Physician.                                                 mr  

14:14 Triage completed.                                                                       aa5 

14:14 Arm band placed on.                                                                     aa5 

14:14 Patient has correct armband on for positive identification.                             aa5 

14:15 Tanesha Szymanski, RN is Primary Nurse.                                                   aa5 

14:17 Radha Liriano FNP-C is PHCP.                                                        snw 

14:17 Ronen Pat MD is Attending Physician.                                                snw 

14:47 CT completed. Patient tolerated procedure well. Patient moved to CT via wheelchair.     vr  

      Patient moved back from CT.                                                                 

14:50 CT Head C Spine In Process Unspecified.                                                 EDMS

15:08 Lio Laguna DO is Referral Physician.                                                snw 

15:40 No provider procedures requiring assistance completed. Patient did not have IV access   aa5 

      during this emergency room visit.                                                           

                                                                                                  

Administered Medications:                                                                         

15:23 Drug: TORadol 60 mg Route: IM; Site: Ventrogluteal LEFT;                                iw  

15:40 Follow up: Response: No adverse reaction                                                aa5 

15:23 Drug: Flexeril 10 mg Route: PO;                                                         iw  

15:40 Follow up: Response: No adverse reaction                                                aa5 

15:23 Drug: fentaNYL (PF) 50 mcg Route: IM; Site: left deltoid;                               iw  

15:40 Follow up: Response: No adverse reaction                                                aa5 

                                                                                                  

                                                                                                  

Outcome:                                                                                          

15:08 Discharge ordered by MD. malhotra 

15:40 Discharged to home ambulatory.                                                          aa5 

15:40 Condition: stable                                                                           

15:40 Discharge instructions given to patient, Instructed on discharge instructions, follow       

      up and referral plans. medication usage, Demonstrated understanding of instructions,        

      follow-up care, medications, Prescriptions given X 2.                                       

15:50 Patient left the ED.                                                                    iw  

                                                                                                  

Signatures:                                                                                       

Dispatcher [employer]                           EDMS                                                 

Radha Liriano, PARAG-C                 FNP-Stew                                                  

Dorota Lin                                                   

Ellie Vega, RN                     RN                                                      

Tanesha Szymanski RN                     RN   [email or web address]                                                  

Nely Driver                              vr                                                   

                                                                                                  

Corrections: (The following items were deleted from the chart)                                    

14:47 14:44 Patient moved to CT via wheelchair. Patient moved to radiology vr                 vr  

14:47 14:44 CT completed. Patient tolerated procedure well vr                                 vr  

17:29 16:00 No provider procedures requiring assistance completed. aa5                        aa5 

17:29 16:00 Patient did not have IV access during this emergency room visit. aa5              aa5 

                                                                                                  

**************************************************************************************************

## 2019-02-06 NOTE — XMS REPORT
[facility]

 Created on:2018



Patient:Lesley Campa

Sex:Female

:1987

External Reference #:[id number]





Demographics







 Address  [address] [place] [address]

 

 Phone  [phone]

 

 Preferred Language  en

 

 Marital Status  Unknown

 

 Rast[employer] Affiliation  Unknown

 

 Race  

 

 Ethnic Group   or 









Author







 Organization  [employer]









Care Team Providers







 Name  Role  Phone

 

 LagunaLio  Provider Role  Unavailable









Allergies

No Known Allergies



Problems







 Problem Type  Condition  Code  Onset Dates  Condition Status

 

 Assessment  Primary insomnia  F51.01    Active

 

 Problem  Seasonal allergic rhinitis,  J30.2    Active



   unspecified trigger      

 

 Assessment  ADHD (attention deficit  F90.0    Active



   hyperactivity disorder), inattentive      



   type      

 

 Problem  PTSD (post-traumatic stress  F43.10    Active



   disorder)      

 

 Problem  Depression with anxiety  F41.8    Active

 

 Problem  Neck pain  M54.2    Active

 

 Problem  Gastroesophageal reflux disease  K21.9    Active



   without esophagitis      

 

 Problem  Hypothyroidism, unspecified type  E03.9    Active

 

 Problem  ADHD (attention deficit  F90.0    Active



   hyperactivity disorder), inattentive      



   type      

 

 Problem  Primary insomnia  F51.01    Active

 

 Assessment  Seasonal allergic rhinitis,  J30.2    Active



   unspecified trigger      

 

 Assessment  PTSD (post-traumatic stress  F43.10    Active



   disorder)      

 

 Assessment  Depression with anxiety  F41.8    Active

 

 Assessment  Neck pain  M54.2    Active

 

 Assessment  Hypothyroidism, unspecified type  E03.9    Active







Medications







 Medication  Code  Code  Instructions  Start  End  Status  Dosage



   System      Date  Date    

 

 Levothyroxine  NDC  21594851505  25 MCG Orally      Active  1 tablet



 Sodium      Once a day        on an



               empty



               stomach in



               the



               morning

 

 Prozac  NDC  20791895945  40 MG Orally      Active  1 capsule



       Once a day        in the



               morning

 

 Zantac  NDC  21479625508  150 MG Orally      Active  1 tablet



       Once a day        at bedtime

 

 Adderall  NDC  86123074163  10 MG Orally      Active  1 tablet



       Twice a day        in the



               morning

 

 Ambien  NDC  83209129620  10 MG Orally      Active  1 tablet



       Once a day        at bedtime



               as needed







Results

No Known Results



Summary Purpose

eClinicalWorks Submission

## 2019-02-06 NOTE — XMS REPORT
[facility]

 Created on:2018



Patient:Lesley Jones

Sex:Female

:1987

External Reference #:[id number]





Demographics







 Address  [address] [place] [address]

 

 Phone  [phone]

 

 Preferred Language  en

 

 Marital Status  Unknown

 

 [employer] Affiliation  Unknown

 

 Race  

 

 Ethnic Group  Unknown









Author







 Organization  [employer]









Care Team Providers







 Name  Role  Phone

 

 Jase Lio  Provider Role  Unavailable









Allergies, Adverse Reactions, Alerts







 Substance  Reaction  Event Type

 

 tramadol  more anxious/no sleep  Drug Allergy

 

 Topamax  mouth break out  Drug Allergy

 

 Sudafed  makes heart race  Drug Allergy







Problems







 Problem Type  Condition  Code  Onset Dates  Condition Status

 

 Problem  Primary insomnia  F51.01    Active

 

 Problem  Hypothyroidism, unspecified type  E03.9    Active

 

 Problem  Seasonal allergic rhinitis,  J30.2    Active



   unspecified trigger      

 

 Problem  Otalgia of both ears  H92.03    Active

 

 Problem  Neck pain  M54.2    Active

 

 Problem  Essential tremor  G25.0    Active

 

 Problem  ADHD (attention deficit  F90.0    Active



   hyperactivity disorder), inattentive      



   type      

 

 Problem  Gastroesophageal reflux disease  K21.9    Active



   without esophagitis      

 

 Problem  PTSD (post-traumatic stress  F43.10    Active



   disorder)      

 

 Problem  Depression with anxiety  F41.8    Active

 

 Assessment  Seasonal allergic rhinitis,  J30.2    Active



   unspecified trigger      

 

 Assessment  PTSD (post-traumatic stress  F43.10    Active



   disorder)      

 

 Assessment  Neck pain  M54.2    Active

 

 Assessment  Essential tremor  G25.0    Active

 

 Assessment  Primary insomnia  F51.01    Active

 

 Assessment  Depression with anxiety  F41.8    Active

 

 Assessment  ADHD (attention deficit  F90.0    Active



   hyperactivity disorder), inattentive      



   type      

 

 Assessment  Hypothyroidism, unspecified type  E03.9    Active







Medications







 Medication  Code  Code  Instructions  Start  End  Status  Dosage



   System      Date  Date    

 

 Levothyroxine  NDC  01866056991  25 MCG Orally      Active  1 tablet



 Sodium      Once a day        on an



               empty



               stomach in



               the



               morning

 

 Duexis  NDC  83347611330  800-26.6 MG      Active  1 tablet



       Orally Three        



       times a day PRN        



       pain        

 

 Prozac  NDC  60667125502  40 MG Orally      Active  1 capsule



       Twice a day        in the



               morning

 

 Propranolol HCl  NDC  27032361386  20 MG Orally  Aug 16,    Active  1 tablet



       Twice a day        on an



               empty



               stomach

 

 Belsomra  NDC  94207128113  10 MG Orally      Active  1 tablet



       Once a day        at bedtime



               as needed

 

 Adderall  NDC  44228819150  10 MG Orally      Active  1 tablet



       Twice a day        in the



               morning

 

 Zantac  NDC  83752953090  150 MG Orally      Active  1 tablet



       Once a day        at bedtime







Results

No Known Results



Summary Purpose

eClinicalWorks Submission

## 2019-02-06 NOTE — XMS REPORT
[facility]

 Created on:2018



Patient:Lesely Campa

Sex:Female

:1987

External Reference #:[id number]





Demographics







 Address  [address] [place] [address]

 

 Phone  [phone]

 

 Preferred Language  en

 

 Marital Status  Unknown

 

 Cheondoism Affiliation  Unknown

 

 Race  

 

 Ethnic Group   or 









Author







 Organization  [employer]









Care Team Providers







 Name  Role  Phone

 

 Lio Laguna  Provider Role  Unavailable









Allergies

No Known Allergies



Problems







 Problem Type  Condition  Code  Onset Dates  Condition Status

 

 Problem  Seasonal allergic rhinitis,  J30.2    Active



   unspecified trigger      

 

 Problem  PTSD (post-traumatic stress  F43.10    Active



   disorder)      

 

 Problem  Depression with anxiety  F41.8    Active

 

 Problem  Neck pain  M54.2    Active

 

 Problem  Gastroesophageal reflux disease  K21.9    Active



   without esophagitis      

 

 Problem  Hypothyroidism, unspecified type  E03.9    Active

 

 Problem  ADHD (attention deficit  F90.0    Active



   hyperactivity disorder), inattentive      



   type      

 

 Problem  Primary insomnia  F51.01    Active







Medications

No Known Medications



Results

No Known Results



Summary Purpose

[employer] Submission

## 2019-02-06 NOTE — XMS REPORT
[facility]

 Created on:2018



Patient:Lesley Campa

Sex:Female

:1987

External Reference #:[id number]





Demographics







 Address  [address] [place] [address]

 

 Phone  [phone]

 

 Preferred Language  en

 

 Marital Status  Unknown

 

 Anglican Affiliation  Unknown

 

 Race  

 

 Ethnic Group  Unknown









Author







 Organization  [employer]









Care Team Providers







 Name  Role  Phone

 

 Malia Navarrete  Provider Role  Unavailable









Allergies, Adverse Reactions, Alerts







 Substance  Reaction  Event Type

 

 tramadol  more anxious/no sleep  Drug Allergy

 

 Topamax  mouth break out  Drug Allergy

 

 Sudafed  makes heart race  Drug Allergy







Problems







 Problem Type  Condition  Code  Onset Dates  Condition Status

 

 Problem  Hypothyroidism, unspecified type  E03.9    Active

 

 Problem  Seasonal allergic rhinitis,  J30.2    Active



   unspecified trigger      

 

 Assessment  Women's annual routine  Z01.419    Active



   gynecological examination      

 

 Assessment  Generalized abdominal pain  R10.84    Active

 

 Assessment  Vagina itching  N89.8    Active

 

 Problem  Neck pain  M54.2    Active

 

 Problem  PTSD (post-traumatic stress  F43.10    Active



   disorder)      

 

 Problem  Otalgia of both ears  H92.03    Active

 

 Problem  Primary insomnia  F51.01    Active

 

 Problem  Gastroesophageal reflux disease  K21.9    Active



   without esophagitis      

 

 Problem  Depression with anxiety  F41.8    Active

 

 Problem  ADHD (attention deficit  F90.0    Active



   hyperactivity disorder),      



   inattentive type      







Medications







 Medication  Code  Code  Instructions  Start  End  Status  Dosage



   System      Date  Date    

 

 Duexis  NDC  82549154411  800-26.6 MG      Active  1 tablet



       Orally Three        



       times a day PRN        



       pain        

 

 Prozac  NDC  05762243114  40 MG Orally      Active  1 capsule



       Twice a day        in the



               morning

 

 Ambien  NDC  06306334755  10 MG Orally      Active  1 tablet



       Once a day        at bedtime



               as needed

 

 Adderall  NDC  81532663048  10 MG Orally      Active  1 tablet



       Twice a day        in the



               morning

 

 Levothyroxine  NDC  03968162206  25 MCG Orally      Active  1 tablet



 Sodium      Once a day        on an



               empty



               stomach in



               the



               morning

 

 Zantac  NDC  87480607179  150 MG Orally      Active  1 tablet



       Once a day        at bedtime







Results







 Name  Result  Date  Reference Range  Unit  Abnormality Flag

 

 URINALYSIS AUTO W/O SCOPE          



 (16983)          

 

 ----NIT  neg  2018      

 

 ----URO  0.2  2018      

 

 ----PROTEIN  neg  2018      

 

 ----pH  6.0  2018      

 

 ----BLO  2+  2018      

 

 ----GLUCOSE  neg  2018      

 

 ----WILLI  neg  2018      

 

 ----BILIRUBIN  neg  2018      

 

 ----KETONES  neg  2018      

 

 ----SPECIFIC GRAVITY  1.030  2018      







Summary Purpose

[employer] Submission

## 2019-02-06 NOTE — XMS REPORT
[facility]

 Created on:April 10, 2018



Patient:Lesley Campa

Sex:Female

:1987

External Reference #:[id number]





Demographics







 Address  [address] [place] [address]

 

 Phone  [phone]

 

 Preferred Language  en

 

 Marital Status  Unknown

 

 Episcopalian Affiliation  Unknown

 

 Race  

 

 Ethnic Group   or 









Author







 Organization  [employer]









Care Team Providers







 Name  Role  Phone

 

 Lio Laguna  Provider Role  Unavailable









Allergies

No Known Allergies



Problems







 Problem Type  Condition  Code  Onset Dates  Condition Status

 

 Problem  Seasonal allergic rhinitis,  J30.2    Active



   unspecified trigger      

 

 Problem  PTSD (post-traumatic stress  F43.10    Active



   disorder)      

 

 Problem  Depression with anxiety  F41.8    Active

 

 Problem  Neck pain  M54.2    Active

 

 Problem  Gastroesophageal reflux disease  K21.9    Active



   without esophagitis      

 

 Problem  Hypothyroidism, unspecified type  E03.9    Active

 

 Problem  ADHD (attention deficit  F90.0    Active



   hyperactivity disorder), inattentive      



   type      

 

 Problem  Primary insomnia  F51.01    Active







Medications

No Known Medications



Results

No Known Results



Summary Purpose

[employer]mission

## 2019-02-06 NOTE — XMS REPORT
[facility]

 Created on:2018



Patient:Lesley Jones

Sex:Female

:1987

External Reference #:[id number]





Demographics







 Address  [address] [place] [address]

 

 Phone  [phone]

 

 Preferred Language  en

 

 Marital Status  Unknown

 

 Mormon Affiliation  Unknown

 

 Race  

 

 Ethnic Group  Unknown









Author







 Organization  [employer]









Care Team Providers







 Name  Role  Phone

 

 Jase Lio  Provider Role  Unavailable









Allergies

No Known Allergies



Problems







 Problem Type  Condition  Code  Onset Dates  Condition Status

 

 Problem  Hypothyroidism, unspecified type  E03.9    Active

 

 Problem  Seasonal allergic rhinitis,  J30.2    Active



   unspecified trigger      

 

 Problem  Neck pain  M54.2    Active

 

 Problem  PTSD (post-traumatic stress  F43.10    Active



   disorder)      

 

 Problem  Otalgia of both ears  H92.03    Active

 

 Problem  Primary insomnia  F51.01    Active

 

 Problem  Gastroesophageal reflux disease  K21.9    Active



   without esophagitis      

 

 Problem  Depression with anxiety  F41.8    Active

 

 Problem  ADHD (attention deficit  F90.0    Active



   hyperactivity disorder), inattentive      



   type      







Medications

No Known Medications



Results

No Known Results



Summary Purpose

[employer] Submission

## 2021-06-03 NOTE — EDPHYS
Physician Documentation                                                                           

 [facility]                                                                 

Name: Lesley Jones                                                                             

Age: 33 yrs                                                                                       

Sex: Female                                                                                       

: 1987                                                                                   

MRN: [id number]                                                                                   

Arrival Date: 2021                                                                          

Time: 03:29                                                                                       

Account#: [id number]                                                                            

Bed 20                                                                                            

Private MD: Juan Lagunah                                                                         

ED Physician Ronen Pat                                                                         

HPI:                                                                                              

                                                                                             

03:42 This 33 yrs old  Female presents to ER via Ambulatory with complaints of Sore  rn  

      Throat, whole body hurt.                                                                    

03:42 The patient presents with sore throat. The patient describes throat pain as burning,    rn  

      raw. Onset: The symptoms/episode began/occurred 10 hour(s) ago. Severity of symptoms:       

      At their worst the symptoms were mild, in the emergency department the symptoms are         

      unchanged. Modifying factors: The symptoms are alleviated by nothing, the symptoms are      

      aggravated by swallowing, Patient's oral intake status: good. Associated signs and          

      symptoms: Pertinent positives: chills, headache, Sore throat Pertinent negatives chest      

      pain, cough, diarrhea. The patient has not experienced similar symptoms in the past.        

      The patient has not recently seen a physician. No known sick contacts. Works in [employer].     

      + fever/chills/sore throat/myalgias. .                                                      

                                                                                                  

OB/GYN:                                                                                           

03:35 LMP 2021                                                                            rr5 

                                                                                                  

Historical:                                                                                       

- Allergies:                                                                                      

03:37 Sudafed (RAPID HEART RATE);                                                             rr5 

03:37 Tramadol HCl (Unable to sleep);                                                         rr5 

- Home Meds:                                                                                      

03:37 Lexapro 10 mg Oral tab 1 tab once daily [Active]; propranolol 20 mg Oral tab 1 tab 2    rr5 

      times per day [Active]; levothyroxine 25 mcg tab 1 tab once daily [Active]; Adderall 10     

      mg Oral tab 1 tab 2 times per day [Active];                                                 

- PMHx:                                                                                           

03:37 ADD/ADHD; Anxiety; Depression; ESSENTIAL TREMORS; Hypothyroidism;                       rr5 

- PSHx:                                                                                           

03:37 ; Cholecystectomy; lap band;                                                   rr5 

                                                                                                  

- Immunization history:: Adult Immunizations up to date.                                          

- Social history:: Smoking status: Patient reports the use of cigarette tobacco                   

  products, smokes one pack cigarettes per day. Patient/guardian denies using alcohol,            

  street drugs.                                                                                   

- Family history:: not pertinent.                                                                 

- Hospitalizations: : No recent hospitalization is reported.                                      

                                                                                                  

                                                                                                  

ROS:                                                                                              

03:42 Constitutional: + fever and chills Eyes: Negative for injury, pain, redness, and        rn  

      discharge, ENT: + sore throat Neck: Negative for injury, pain, and swelling,                

      Cardiovascular: Negative for chest pain, palpitations, and edema, Respiratory: Negative     

      for shortness of breath, cough, wheezing, and pleuritic chest pain, Abdomen/GI:             

      Negative for abdominal pain, nausea, vomiting, diarrhea, and constipation, Back:            

      Negative for injury and pain, : Negative for injury, bleeding, discharge, and             

      swelling, MS/Extremity: Negative for injury and deformity, Skin: Negative for injury,       

      rash, and discoloration, Neuro: Negative for weakness, numbness, tingling, and seizure.     

                                                                                                  

Exam:                                                                                             

03:42 Constitutional:  This is a well developed, well nourished patient who is awake, alert,  rn  

      and in no acute distress.  Ambulatory to room without difficulty or assistance.             

      Head/Face:  Normocephalic, atraumatic. Eyes:  Periorbital areas with no swelling,           

      redness, or edema. ENT:  + bilateral tonsillar hypertrophy, uvula midline, no PTA Neck:     

       + tender bilateral cervical LAD Cardiovascular:  Tachycardic, regular Respiratory:  No     

      increased work of breathing, no retractions or nasal flaring. Skin:  Warm, dry, no          

      rashes MS/ Extremity:  Pulses equal, no cyanosis.  Neurovascular intact.  Full, normal      

      range of motion.  Equal circumference. Neuro:  Awake and alert, GCS 15                      

                                                                                                  

Vital Signs:                                                                                      

03:32  / 73; Pulse 104; Resp 19; Temp 99.5; Pulse Ox 98% ; Weight 81.65 kg; Height 5    rr5 

      ft. 2 in. (157.48 cm); Pain 10/10;                                                          

05:27  / 70; Pulse 95; Resp 16; Pulse Ox 98% ;                                          rr5 

05:45  / 78; Pulse 90; Resp 19; Temp 99; Pulse Ox 100% ;                                rr5 

03:32 Body Mass Index 32.92 (81.65 kg, 157.48 cm)                                             rr5 

                                                                                                  

MDM:                                                                                              

03:30 Patient medically screened.                                                             rn  

05:39 Differential diagnosis: group A strep tonsillitis, influenza, laryngitis, pharyngitis,  rn  

      tonsillitis, upper respiratory infection, viral syndrome. Data reviewed: vital signs,       

      nurses notes, lab test result(s), and as a result, I will discharge patient.                

      Counseling: I had a detailed discussion with the patient and/or guardian regarding: the     

      historical points, exam findings, and any diagnostic results supporting the                 

      discharge/admit diagnosis, lab results, the need for outpatient follow up, to return to     

      the emergency department if symptoms worsen or persist or if there are any questions or     

      concerns that arise at home. Special discussion: I discussed with the patient/guardian      

      in detail that at this point there is no indication for admission to the hospital. It       

      is understood, however, that if the symptoms persist or worsen the patient needs to         

      return immediately for re-evaluation.                                                       

                                                                                                  

                                                                                             

03:41 Order name: Strep                                                                       rn  

                                                                                             

03:42 Order name: Group A Streptococcus Rapid Sc; Complete Time: 05:39                        EDMS

                                                                                             

05:20 Order name: Throat Culture                                                              EDMS

                                                                                             

05:30 Order name: COVID-19/FLU A+B; Complete Time: 05:39                                      EDMS

                                                                                                  

Administered Medications:                                                                         

03:50 Drug: Motrin (ibuprofen) 800 mg Route: PO;                                              rr5 

04:50 Follow up: Response: No adverse reaction                                                rr5 

                                                                                                  

                                                                                                  

Disposition:                                                                                      

21 05:39 Discharged to Home. Impression: Acute tonsillitis.                                 

- Condition is Stable.                                                                            

- Discharge Instructions: Tonsillitis.                                                            

- Prescriptions for Augmentin 875- 125 mg Oral Tablet - take 1 tablet by ORAL route               

  every 12 hours for 10 days; 20 tablet.                                                          

- Medication Reconciliation Form, Thank You Letter, Antibiotic Education, Prescription            

  Opioid Use form.                                                                                

- Follow up: Private Physician; When: As needed; Reason: Recheck today's complaints,              

  Re-evaluation by your physician.                                                                

- Problem is new.                                                                                 

- Symptoms have improved.                                                                         

                                                                                                  

                                                                                                  

                                                                                                  

Signatures:                                                                                       

Dispatcher [employer]                           [facility]                                                 

Ronen Pat MD MD rn Roque, Raymond RN                      RN   rr5                                                  

                                                                                                  

Corrections: (The following items were deleted from the chart)                                    

04:21 03:42 Influenza Screen (A \T\ B)+BA.LAB.BRZ ordered. [facility]

04:23 03:42 CORONAVIRUS+MR.LAB.BRZ ordered. [facility]

05:47 05:39 2021 05:39 Discharged to Home. Impression: Acute tonsillitis. Condition is  rr5 

      Stable. Forms are Medication Reconciliation Form, Thank You Letter, Antibiotic              

      Education, Prescription Opioid Use. Follow up: Private Physician; When: As needed;          

      Reason: Recheck today's complaints, Re-evaluation by your physician. Problem is new.        

      Symptoms have improved. rn                                                                  

                                                                                                  

**************************************************************************************************

## 2021-06-03 NOTE — ER
Nurse's Notes                                                                                     

 [facility]                                                                 

Name: Lesley Jones                                                                             

Age: 33 yrs                                                                                       

Sex: Female                                                                                       

: 1987                                                                                   

MRN: [id number]                                                                                   

Arrival Date: 2021                                                                          

Time: 03:29                                                                                       

Account#: [id number]                                                                            

Bed 20                                                                                            

Private MD: Lio Laguna                                                                         

Diagnosis: Acute tonsillitis                                                                      

                                                                                                  

Presentation:                                                                                     

                                                                                             

03:32 Chief complaint: Patient states: my throat is hurting so bad started yesterday that I   rr5 

      am having hard time to swallow and feels so cold. denies cough,or fever. Coronavirus        

      screen: sore throat, Client presents with at least one sign or symptom that may             

      indicate coronavirus-19. Standard/surgical mask placed on the client. Provider              

      contacted for isolation considerations. Ebola Screen: Patient negative for fever            

      greater than or equal to 101.5 degrees Fahrenheit, and additional compatible Ebola          

      Virus Disease symptoms Patient denies exposure to infectious person. Patient denies         

      travel to an Ebola-affected area in the 21 days before illness onset. Initial Sepsis        

      Screen: Does the patient meet any 2 criteria? HR > 90 bpm. Does the patient have a          

      suspected source of infection? Yes: Productive cough/pneumonia. Risk Assessment: Do you     

      want to hurt yourself or someone else? Patient reports no desire to harm self or            

      others. Onset of symptoms was 2021.                                                 

03:32 Method Of Arrival: Ambulatory                                                           rr5 

03:32 Acuity: KAYLA 4                                                                           rr5 

                                                                                                  

OB/GYN:                                                                                           

03:35 LMP 2021                                                                            rr5 

                                                                                                  

Historical:                                                                                       

- Allergies:                                                                                      

03:37 Sudafed (RAPID HEART RATE);                                                             rr5 

03:37 Tramadol HCl (Unable to sleep);                                                         rr5 

- Home Meds:                                                                                      

03:37 Lexapro 10 mg Oral tab 1 tab once daily [Active]; propranolol 20 mg Oral tab 1 tab 2    rr5 

      times per day [Active]; levothyroxine 25 mcg tab 1 tab once daily [Active]; Adderall 10     

      mg Oral tab 1 tab 2 times per day [Active];                                                 

- PMHx:                                                                                           

03:37 ADD/ADHD; Anxiety; Depression; ESSENTIAL TREMORS; Hypothyroidism;                       rr5 

- PSHx:                                                                                           

03:37 ; Cholecystectomy; lap band;                                                   rr5 

                                                                                                  

- Immunization history:: Adult Immunizations up to date.                                          

- Social history:: Smoking status: Patient reports the use of cigarette tobacco                   

  products, smokes one pack cigarettes per day. Patient/guardian denies using alcohol,            

  street drugs.                                                                                   

- Family history:: not pertinent.                                                                 

- Hospitalizations: : No recent hospitalization is reported.                                      

                                                                                                  

                                                                                                  

Screenin:51 Abuse screen: Denies threats or abuse. Denies injuries from another. Nutritional        rr5 

      screening: No deficits noted. Tuberculosis screening: No symptoms or risk factors           

      identified. Fall Risk None identified. Total Peter Fall Scale indicates No Risk (0-24       

      pts).                                                                                       

                                                                                                  

Assessment:                                                                                       

03:51 General: Appears in no apparent distress. Behavior is calm, cooperative, appropriate    rr5 

      for age. Pain: Complains of pain in throat and neck Pain currently is 10 out of 10 on a     

      pain scale. Quality of pain is described as aching, Pain began gradually, Is                

      intermittent. Neuro: Level of Consciousness is awake, alert, obeys commands, Oriented       

      to person, place, time. Cardiovascular: Capillary refill < 3 seconds Patient's skin is      

      warm and dry. Respiratory: Airway is patent Respiratory effort is even, unlabored,          

      Respiratory pattern is regular, symmetrical, EENT: Throat has enlarged tonsils. Derm:       

      Skin is intact, is healthy with good turgor, Skin temperature is warm. Musculoskeletal:     

      Capillary refill < 3 seconds.                                                               

05:05 Reassessment: Patient appears in no apparent distress at this time. awaiting for        rr5 

      results, resting eyes closed breathing spontaneously at room air.                           

05:46 Reassessment: Patient appears in no apparent distress at this time. Patient is alert,   rr5 

      oriented x 3, equal unlabored respirations, skin warm/dry/pink. discharge instruction       

      given and explained without complaints made.                                                

                                                                                                  

Vital Signs:                                                                                      

03:32  / 73; Pulse 104; Resp 19; Temp 99.5; Pulse Ox 98% ; Weight 81.65 kg; Height 5    rr5 

      ft. 2 in. (157.48 cm); Pain 10/10;                                                          

05:27  / 70; Pulse 95; Resp 16; Pulse Ox 98% ;                                          rr5 

05:45  / 78; Pulse 90; Resp 19; Temp 99; Pulse Ox 100% ;                                rr5 

03:32 Body Mass Index 32.92 (81.65 kg, 157.48 cm)                                             rr5 

                                                                                                  

ED Course:                                                                                        

03:29 Patient arrived in ED.                                                                  es  

03:30 Lio Laguna DO is Private Physician.                                                 es  

03:30 Andrés Reinoso, RN is Primary Nurse.                                                    rr5 

03:30 Ronen Pat MD is Attending Physician.                                                rn  

03:35 Triage completed.                                                                       rr5 

03:52 Patient has correct armband on for positive identification. Bed in low position. Call   rr5 

      light in reach. Pulse ox on. NIBP on.                                                       

03:52 Arm band placed on right wrist.                                                         rr5 

03:52 COVID swab sent to lab. Flu and/or RSV swab sent to lab. Strep swab sent to lab.        rr5 

05:46 No provider procedures requiring assistance completed. Patient did not have IV access   rr5 

      during this emergency room visit.                                                           

                                                                                                  

Administered Medications:                                                                         

03:50 Drug: Motrin (ibuprofen) 800 mg Route: PO;                                              rr5 

04:50 Follow up: Response: No adverse reaction                                                rr5 

                                                                                                  

                                                                                                  

Outcome:                                                                                          

05:39 Discharge ordered by MD.                                                                rn  

05:46 Discharged to home ambulatory.                                                          rr5 

05:46 Condition: stable                                                                           

05:46 Discharge instructions given to patient, Instructed on discharge instructions, follow       

      up and referral plans. medication usage, Demonstrated understanding of instructions,        

      follow-up care, medications, Prescriptions given X 1.                                       

05:47 Patient left the ED.                                                                    rr5 

                                                                                                  

Signatures:                                                                                       

Rowan Boykin Roman, MD MD rn Roque, Raymond, RN                      RN   rr5                                                  

                                                                                                  

**************************************************************************************************

## 2021-06-03 NOTE — XMS REPORT
Continuity of Care Document

                             Created on:Raquel 3, 2021



Patient:ROGER REZA

Sex:Female

:1987

External Reference #:[id number]





Demographics







                          Address                   [address]



                                                    [place] [address]

 

                          Home Phone                [phone]

 

                          Work Phone                [phone]

 

                          Mobile Phone              [phone]

 

                          Email Address             [email or web address]

[email or web address]

 

                          Preferred Language        en

 

                          Marital Status            Unknown

 

                          Church Affiliation     Unknown

 

                          Race                      Unknown

 

                          Additional Race(s)        Unavailable



                                                    White

 

                          Ethnic Group              Unknown









Author







                          Organization              [facility]

t

 

                          Address                   [address]



                                                    [place] [address]

 

                          Phone                     [phone]









Support







                Name            Relationship    Address         Phone

 

                Robert        Unavailable     [address] [phone]



                                                [place] [address] 

 

                Robert        Unavailable     [address] [phone]



                                                [place] [address] 

 

                Lv       Mother          [address]    [phone]



                                                [place] [address] 

 

                Blank        Spouse          [phone]      [phone]



                                                [place] [address] 

 

                Robert        Spouse          Unavailable     [phone]









Care Team Providers







                    Name                Role                Phone

 

                    REYES Catalan       Attending Clinician [phone]

 

                    Provider,  Urgent Care Attending Clinician Unavailable

 

                    Lab,  [facility]am [facility] I     Attending Clinician Unavailable

 

                    Greg Arreola DO   Attending Clinician [phone]

 

                    Singer COSME           Attending Clinician [phone]

 

                    Doctor Unassigned,  Name Attending Clinician Unavailable

 

                    BLADIMIR Serrato     Attending Clinician [phone]

 

                    Only,  Test         Attending Clinician Unavailable









Problems

This patient has no known problems.



Allergies, Adverse Reactions, Alerts







       Allergy Allergy Status Severity Reaction(s) Onset  Inactive Treating Comm

ents 

Source



       Name   Type                        Date   Date   Clinician        

 

       Topamax Adverse Active        mouth break                             [facility]



              Reaction               out                                [facility]



                                                                      [facility]



                                                                      [facility]



                                                                      [facility]



                                                                      ent



                                                                      Clinics

 

       tramadol Adverse Active        more                               [facility]



              Reaction               anxious/no                             [facility]

[facility] -



                                   sleep                              [facility]



                                                                      l



                                                                      [facility]



                                                                      ent



                                                                      Clinics

 

       Sudafed Adverse Active        makes heart                             [facility]



              Reaction               race                               [facility]



                                                                      [facility]



                                                                      l



                                                                      [facility]



                                                                      ent



                                                                      Clinics







Medications







       Ordered Filled Start  Stop   Current Ordering Indication Dosage Frequency

 Signature

                    Comments            Components          Source



     Medication Medication Date Date Medication? Clinician                (SIG) 

          



     Name Name                                                   

 

     Amphetamine Amphetamine 2020      Yes  Lio                1 tablet    

       [facility]



     -Dextroamph -Dextroamph 0-12           [place]                               

[facility]



     et[facility]00[facility]                                              [facility]

 

     Adderall Adderall       Yes  Lio                1 tablet          

 [facility]



               9-15           [place]                in the           [facility]



               00:00:                               morning           [facility]



               [facility]

 

     HydrOXYzine HydrOXYzine       Yes  Lio                1 capsule   

        [facility]



     Pamoate Pamoate 9-15           [place]                as needed           [facility]

es -



               00:00:                                              [facility]



               00                                                [facility]

 

     Gabapentin Gabapentin       Yes  Lio                1 capsule     

      [facility]



               3-17           [place]                once           [facility]



               00:00:                               today,           [facility]



               00                                 then 1           l



                                                  capsule           [facility]



                                                  tw[facility]           ent



                                                  daily           Clinics

 

     Valacyclovi Valacyclovi       Yes  Lio                1 tablet    

       [facility]



     r HCl r HCl 3-13           [place]                               [facility]



               00[facility]00:                                              [facility]



               00                                                [facility]

 

     Levothyroxi Levothyroxi           Yes  Lio                1 tablet      

     [facility]



     ne Sodium ne Sodium                [place]                on an           [facility]

es -



                                                  empty           [facility]



                                                  stomach in           l



                                                  [facility]



                                                  [facility]           ent



                                                                 Clinics

 

     Propranolol Propranolol           Yes  Lio                1 tablet      

     [facility]



     HCl  HCl                 [place]                on an           [facility]



                                                  empty           [facility]



                                                  stomach           [facility]



                                                                 [facility]

 

     Duexis Duexis           Yes  [facility]                1 tablet           [facility]

t



                              [place]                               [facility]



                                                                 [facility]

 

     Belsomra Belsomra           Yes  Lio                1 tablet           C

[facility]



                              [place]                at bedtime           [facility]



                                                  as needed           [facility]

 

     Zantac Zantac           Yes  [facility]                1 tablet           [facility]

t



                              [place]                at bedtime           [facility]



                                                                 [facility]

 

     Cetirizine Cetirizine           Yes  [place]                1 tablet        

   [facility]



     HCl  HCl                 [place]                               [facility]



                                                                 [facility]

 

     Escitalopra Escitalopra           Yes  [place]io                1 tablet      

     [facility]



     m Oxalate m Oxalate                [place]                               [facility]

[facility] -



                                                                 [facility]



                                                                 l



                                                                 [facility]



                                                                 ent



                                                                 Clinics







Immunizations







           Ordered    Filled Immunization Date       Status     Comments   Sourc

e



           Immunization Name Name                                        

 

           TDAP- Boostrix TDAP- Boostrix 2019 Completed             [facility]

 [facility]



                                 00:00:00                         [facility]







Procedures

This patient has no known procedures.



Encounters







        Start   End     Encounter Admission Attending Care    Care    Encounter 

Source



        Date/Time Date/Time Type    Type    Clinicians Facility Department ID   

   

 

        [id number]



        [facility]00                                                 [facility]



                                                                        [facility]



                                                                        l



                                                                        Outpati



                                                                        ent



                                                                        Clinics

 

        2021 Refill          [facility]    [id number]

62 



        00:00:00 00:00:00                 [facility]  350.1.13.10         



                                                [place] [id number]         



                                                [facility] [phone]         



                                                nal     [facility]             



                                                Office                  



                                                Building                 



                                                One                     

 

        2021 Urgent          Provider, [facility]    [id number]

[id number] 



        15:32:06 15:52:06 Care            [facility]  350.1.13.10        

 



                                        Care    [place] [id number]         



                                                [facility] [phone]         



                                                [place] [address]             



                                                Office                  



                                                Building                 



                                                One                     

 

        2021 Outpatient                 [facility]  [id number]

 [facility]



        00[facility]00[facility]00                                                 [facility]



                                                                        [facility]



                                                                        l



                                                                        Outpati



                                                                        ent



                                                                        Clinics

 

        2021 Laboratory         Lab, [facility]    [id number]

[id number] 



        11:15:55 11:35:55 Only            [facility]  350.1.13.10         



                                                [place] [id number]         



                                                [facility] [phone]         



                                                nal     [facility]             



                                                Office                  



                                                Building                 



                                                One                     

 

        2021 Patient         [place],  [facility]    [id number]

[id number] 



        00:00:00 00:00:00 Outreach         [place]  PRIMARY 350.1.13.10         



                                        [facility]    [id number]         



                                                [place] [phone]         



                                                        [phone]             

 

        [facility]  [id number]

 [facility]



        00[facility]00[facility]00                                                 [facility]



                                                                        [facility]



                                                                        l



                                                                        Outpati



                                                                        ent



                                                                        Clinics

 

        [facility]  [id number]

 [facility]



        [facility]00                                                 [facility]



                                                                        [facility]



                                                                        l



                                                                        Outpati



                                                                        ent



                                                                        Clinics

 

        2021-02-10 2021[facility]02-10 [facility]  [id number]

 [facility]



        [facility]00                                                 [facility]



                                                                        [facility]



                                                                        l



                                                                        Outpati



                                                                        ent



                                                                        Clinics

 

        2021-02-10 2021[facility]02-10 [facility]  [id number]

 [facility]



        [facility]00                                                 [facility]



                                                                        [facility]



                                                                        l



                                                                        Outpati



                                                                        ent



                                                                        Clinics

 

        2021 Outpatient                 [facility]  [id number]

 [facility]



        00:[facility]00[facility]00                                                 [facility]



                                                                        [facility]



                                                                        l



                                                                        Outpati



                                                                        ent



                                                                        Clinics

 

        2021 [facility]                 [facility]  [id number]

 [facility]



        00:00[facility]00:00                                                 [facility]



                                                                        [facility]



                                                                        l



                                                                        Outpati



                                                                        ent



                                                                        Clinics

 

        2020 [facility]                 [facility]  [id number]

 [facility]



        00:00[facility]00:00                                                 [facility]



                                                                        [facility]



                                                                        l



                                                                        Outpati



                                                                        ent



                                                                        Clinics

 

        2020 [facility]                 [facility]  [id number]

 [facility]



        00:[facility]00[facility]00                                                 [facility]



                                                                        [facility]



                                                                        l



                                                                        Outpati



                                                                        ent



                                                                        Clinics

 

        2020 [facility]                 [facility]  [id number]

 [facility]



        00:[facility]00                                                 [facility]



                                                                        [facility]



                                                                        l



                                                                        Outpati



                                                                        ent



                                                                        Clinics

 

        2020 Emergency         Singer, [facility]    [id number]

[id number] 



        17:19:00 17:57:00                 [place] 350.1.13.10         



                                                [place] [id number]         



                                                [place]  [phone]         



                                                        084             

 

        2020 Orders          Doctor  KIERAN    [id number]

[id number] 



        00:00:00 00:00:00 Only            Unassigned, JOHN   350.1.13.10       

  



                                        [facility] [id number]         



                                                        [phone]         



                                                        [phone]             

 

        2020 [facility]                 [facility]  [id number]

 [facility]



        00:[facility]00[facility]00                                                 [facility]



                                                                        [facility]



                                                                        l



                                                                        Outpati



                                                                        ent



                                                                        Clinics

 

        2020 Laboratory         Lab, [facility]    [id number]

[id number] 



        15:47:38 16:07:38 Only            [facility]  350.1.13.10         



                                                [place] [id number]         



                                                [facility] [phone]         



                                                [facility]     044             



                                                Office                  



                                                Building                 



                                                One                     

 

        2020 [facility]                 [facility]  [id number]

 [facility]



        00:00[facility]00 [facility]00[facility]00                                                 [facility]



                                                                        [facility]



                                                                        l



                                                                        Outpati



                                                                        ent



                                                                        Clinics

 

        2020 Outpatient                 [facility] [facility]  [id number]

 [facility]



        [facility]00                                                 [facility] 

[facility]



                                                                        [facility]



                                                                        l



                                                                        Outpati



                                                                        ent



                                                                        Clinics

 

        2020 Outpatient                 [facility]  [id number]

 [facility]



        00[facility]00                                                 [facility] 

[facility]



                                                                        [facility]



                                                                        l



                                                                        Outpati



                                                                        ent



                                                                        Clinics

 

        2020-09-15 2020-09-15 Outpatient                 [facility]

[id number] [facility]



        14:40:00 14:40:00                         t [employer]

s -



                                                [employer]   [facility]  Medicine         l



                                                Med[facility]                 [facility]



                                                                        [facility]



                                                                        Clinics

 

        2020 Emergency         [place], [facility]    [id number]

[id number] 



        21:47:00 22:45:00                 Nica GARRISON [place] [id number]         



                                                [place] [id number]         



                                                [place]  [id number]         



                                                        084             

 

        2020 2020-07-10 Laboratory         Only, Dic [facility]    [id number]

[id number] 



        15:11:12 16:50:00 Only            Test    HEALTH  [id number]         



                                                FAMILY  [id number]         



                                                MEDICINE [id number]         



                                                [place] [id number]             



                                                CLINIC                  

 

        2020 Outpatient                 [facility]

[id number] [facility]



        11:15:00 11:15:00                         t [employer]

s -



                                                [employer]   [facility]  Medicine         l



                                                Medicine                 Outpati



                                                                        ent



                                                                        Clinics

 

        2020 Outpatient                 [facility]

[id number] [facility]



        14:30:00 14:30:00                         t [employer]

s [employer]   [facility]  Medicine         l



                                                Medicine                 Outpati



                                                                        ent



                                                                        Clinics

 

        2020 Outpatient                 [facility]

[id number] [facility]



        08:18:00 08:18:00                         [place]  [facility]         l



                                                Medicine                 Outpati



                                                                        ent



                                                                        Clinics

 

        2020 Outpatient                 [facility]

[id number] [facility]



        09:40:00 09:40:00                         t [facility]



                                                Medicine                 Outpati



                                                                        ent



                                                                        Clinics

 

        2020 Outpatient                 [facility]

[id number] [facility]



        14:37:00 14:37:00                         t [place]   [facility]

s -



                                                [employer]   [facility]  Medicine         l



                                                Medicine                 Outpati



                                                                        ent



                                                                        Clinics

 

        2020 Outpatient                 [facility]

[id number] [facility]



        15:10:00 15:10:00                         t [place]   [facility]

s -



                                                [employer]   [facility]         l



                                                Medicine                 Outpati



                                                                        ent



                                                                        Clinics

 

        2020 Outpatient                 [facility]

[id number] [facility]



        09:56:00 09:56:00                         t [place]   [facility]

s -



                                                [employer]   [facility]



                                                Medicine                 Outpati



                                                                        ent



                                                                        Clinics

 

        2020 Outpatient                 [facility]

[id number] [facility]



        09:15:00 09:15:00                         t [place]   [facility]

s [employer]   [facility]         l



                                                Medicine                 Outpati



                                                                        ent



                                                                        Clinics

 

        2020 Outpatient                 [facility]

[id number] [facility]



        10:30:00 10:30:00                         t [employer]

s [employer]   [facility]         l



                                                Medicine                 Outpati



                                                                        ent



                                                                        Clinics

 

        2020 Outpatient                 [facility]

[id number] [facility]



        09:40:00 09:40:00                         t [facility]         l



                                                Medicine                 Outpati



                                                                        ent



                                                                        Clinics

 

        2019 Outpatient                 [facility]

[id number] [facility]



        08:00:00 08:00:00                         t [place]   [facility]

s [employer]   [facility]         l



                                                Medicine                 Outpati



                                                                        ent



                                                                        Clinics

 

        2019 Outpatient                 [facility]

[id number] [facility]



        11:45:00 11:45:00                         t [place]   [facility]

s [employer]   [facility]         l



                                                Medicine                 Outpati



                                                                        ent



                                                                        Clinics

 

        2019 Outpatient                 [facility]

[id number] [facility]



        08:03:00 08:03:00                         t [place]   [address]         [facility]

s -



                                                Drive   [facility]  Medicine         l



                                                Medicine                 Outpati



                                                                        ent



                                                                        Clinics

 

        2019-10-08 2019-10-08 Outpatient                 [facility]

[id number] [facility]



        10:49:00 10:49:00                         t [place]   [address]         [facility]

s -



                                                Drive   [facility]  Medicine         l



                                                Medicine                 Outpati



                                                                        ent



                                                                        Clinics

 

        2019-10-02 2019-10-02 Outpatient                 [facility]

[id number] [facility]



        10:56:00 10:56:00                         t [place]   [address]         [facility]

s -



                                                Drive   [facility]  Medicine         l



                                                Medicine                 Outpati



                                                                        ent



                                                                        Clinics

 

        2019-10-01 2019-10-01 Outpatient                 [facility]

[id number] [facility]



        16:12:00 16:12:00                         t [place]   [address]         [facility]

s -



                                                Drive   [facility]         l



                                                Medicine                 Outpati



                                                                        ent



                                                                        Clinics

 

        2019 Outpatient                 [facility]

[id number] [facility]



        09:00:00 09:00:00                         t [place]   [place] [employer]         [facility]

s -



                                                Drive   [facility]



                                                Medicine                 Outpati



                                                                        ent



                                                                        Clinics

 

        2019 Outpatient                 [facility]

[id number] [facility]



        14:18:00 14:18:00                         t [place]   [place] [employer]         [facility]

s -



                                                Drive   [facility]  Medicine         l



                                                Medicine                 Outpati



                                                                        ent



                                                                        Clinics

 

        2019 Outpatient                 [facility]

[id number] [facility]



        08:30:00 08:30:00                         t [place]   [place] [employer]         [facility]

s -



                                                Drive   [facility]  Medicine         l



                                                Medicine                 Outpati



                                                                        ent



                                                                        Clinics

 

        2019 Outpatient                 [facility]

[id number] [facility]



        10:38:00 10:38:00                         t [place]   [address]         [facility]

s -



                                                Drive   [facility]  Medicine         l



                                                Medicine                 Outpati



                                                                        ent



                                                                        Clinics

 

        2019 Outpatient                 [facility]

[id number] [facility]



        08:15:00 08:15:00                         t [place]   [address]         [facility]

s -



                                                Drive   [facility]         l



                                                Medicine                 Outpati



                                                                        ent



                                                                        Clinics

 

        2019 Outpatient                 [facility]

[id number] [facility]



        08:45:00 08:45:00                         t [place]   [address]         [facility]

s -



                                                Drive   [facility]  Medicine         l



                                                Medicine                 Outpati



                                                                        ent



                                                                        Clinics

 

        2019 Outpatient                 [facility]

[id number] [facility]



        08:15:00 08:15:00                         t [place]   [address]         [facility]

s -



                                                Drive   [facility]  Medicine         l



                                                Medicine                 Outpati



                                                                        ent



                                                                        Clinics

 

        2018-10-03 2018-10-03 Outpatient                 [facility]

[id number] [facility]



        08:30:00 08:30:00                         t [place]   [address]         [facility]

s -



                                                Drive   [facility]



                                                Medicine                 Outpati



                                                                        ent



                                                                        Clinics

 

        2018 Outpatient                 [facility]

[id number] [facility]



        15:30:00 15:30:00                         t [place]   [address]         [facility]

s -



                                                Drive   [facility]  Medicine         l



                                                Medicine                 Outpati



                                                                        ent



                                                                        Clinics

 

        2018 Outpatient                 [facility]

[id number] [facility]



        09:21:00 09:21:00                         t [place]   [address]         [facility]

s -



                                                Drive   [facility]



                                                Medicine                 Outpati



                                                                        ent



                                                                        Clinics

 

        2018 Outpatient                 [facility]

[id number] [facility]



        14:07:00 14:07:00                         t [place]   [address]         [facility]

s -



                                                Drive   [facility]



                                                Medicine                 Outpati



                                                                        ent



                                                                        Clinics

 

        2018 Outpatient                 [facility]

[id number] [facility]



        13:30:00 13:30:00                         t [place]   [address]         [facility]

s -



                                                Drive   [facility]



                                                Medicine                 Outpati



                                                                        ent



                                                                        Clinics

 

        2018 Outpatient                 [facility]

[id number] [facility]



        15:45:00 15:45:00                         t [place]   [address]         [facility]

s -



                                                Drive   [facility]



                                                Medicine                 Outpati



                                                                        ent



                                                                        Clinics

 

        2018 Outpatient                 [facility]

[id number] [facility]



        08:27:00 08:27:00                         [facility]         [facility]

[facility] -



                                                Care    [facility]         [facility]

[facility]



                                                Clinic                  l



                                                                        Outpati



                                                                        ent



                                                                        Clinics

 

        2018 Outpatient                 [facility]

[id number] [facility]



        15:35:00 15:35:00                         [facility]         [facility]

s -



                                                Care    [facility]         [facility]

[facility]



                                                Clinic                  l



                                                                        Outpati



                                                                        ent



                                                                        Clinics

 

        2018 Outpatient                 [facility]

[id number] [facility]



        15:34:00 15:34:00                         [facility]         [facility]

s -



                                                Care    [facility]         [facility]

[facility]



                                                Clinic                  l



                                                                        Outpati



                                                                        ent



                                                                        Clinics

 

        2018 Outpatient                 [facility]

[id number] [facility]



        15:15:00 15:15:00                         [facility]         [facility]

s -



                                                Care    [facility]         [facility]

[facility]



                                                Clinic                  l



                                                                        Outpati



                                                                        ent



                                                                        Clinics

 

        2018 Outpatient                 [facility]

[id number] [facility]



        15:45:00 15:45:00                         t [place]   [address]         [facility]

s -



                                                Drive   [facility]



                                                Family  Medicine         l



                                                Medicine                 Outpati



                                                                        ent



                                                                        Clinics

 

        2018 Outpatient                 [facility]

[id number] [facility]



        10:09:00 10:09:00                         t [place]   [address]         [facility]

s -



                                                Drive   [facility]  Medicine         l



                                                Medicine                 Outpati



                                                                        ent



                                                                        Clinics

 

        2018 Outpatient                 [facility]

[id number] [facility]



        13:00:00 13:00:00                         t [place]   [address]         [facility]

s -



                                                Drive   [facility]  Medicine         l



                                                Medicine                 Outpati



                                                                        ent



                                                                        Clinics

 

        2018 Outpatient                 [facility]

[id number] [facility]



        15:04:00 15:04:00                         t [place]   [address]         [facility]

s -



                                                Drive   [facility]



                                                Family  Medicine         l



                                                Medicine                 Outpati



                                                                        ent



                                                                        Clinics

 

        2018 Outpatient                 [facility]

[id number] [facility]



        13:54:00 13:54:00                         t [place]   [address]         [facility]

s -



                                                Drive   [facility]  Medicine         l



                                                Medicine                 Outpati



                                                                        ent



                                                                        Clinics

 

        2018 Outpatient                 [facility]

[id number] [facility]



        11:11:00 11:11:00                         t [place]   [address]         [facility]

s -



                                                Drive   [facility]  Medicine         l



                                                Medicine                 Outpati



                                                                        ent



                                                                        Clinics

 

        2018-04-10 2018-04-10 Outpatient                 [facility]

[id number] [facility]



        08:52:00 08:52:00                         t [place]   [address]         [facility]

s -



                                                Drive   [facility]  Medicine         l



                                                Medicine                 Outpati



                                                                        ent



                                                                        Clinics

 

        2018 Outpatient                 [facility]

[id number] [facility]



        13:00:00 13:00:00                         t [place]   [address]         [facility]

s -



                                                Drive   [facility]  Medicine         l



                                                Medicine                 Outpati



                                                                        ent



                                                                        Clinics







Results

This patient has no known results.

## 2021-06-04 NOTE — EDPHYS
Physician Documentation                                                                           

 [facility]                                                                 

Name: Lesley Jones                                                                             

Age: 33 yrs                                                                                       

Sex: Female                                                                                       

: 1987                                                                                   

MRN: [id number]                                                                                   

Arrival Date: 2021                                                                          

Time: 10:18                                                                                       

Account#: [id number]                                                                            

Bed 10                                                                                            

Private MD:                                                                                       

ED Physician Pedro Fay                                                                       

HPI:                                                                                              

                                                                                             

10:45 This 33 yrs old  Female presents to ER via Ambulatory with complaints of       jr8 

      Tonsil Swelling.                                                                            

10:45 The patient presents with sore throat, dysphagia, of both solids and liquids. The       jr8 

      patient describes throat pain as constant. Onset: The symptoms/episode began/occurred       

      acutely, 2 day(s) ago. Severity of symptoms: At their worst the symptoms were moderate.     

      Modifying factors: The symptoms are alleviated by nothing, the symptoms are aggravated      

      by swallowing. Associated signs and symptoms: The patient has no apparent associated        

      signs or symptoms. The patient has not experienced similar symptoms in the past. The        

      patient has been recently seen by a physician: The patient has been recently seen at        

      the [facility] Emergency Department, yesterday, for similar          

      complaints labs were performed, was given a prescription for antibiotics, the patient       

      was told to return for a recheck. Patient stated that it has become harder to swallow       

      and was worried that her symptoms were worsening .                                          

                                                                                                  

OB/GYN:                                                                                           

10:36 LMP N/A - Irregular menses                                                              jd3 

                                                                                                  

Historical:                                                                                       

- Allergies:                                                                                      

10:36 Sudafed (RAPID HEART RATE);                                                             jd3 

10:36 Tramadol HCl (Unable to sleep);                                                         jd3 

- PMHx:                                                                                           

10:36 ADD/ADHD; Anxiety; Depression; ESSENTIAL TREMORS; Hypothyroidism;                       jd3 

- PSHx:                                                                                           

10:36 ; Cholecystectomy; lap band;                                                   jd3 

                                                                                                  

- Immunization history:: Adult Immunizations up to date.                                          

- Social history:: Smoking status: unknown.                                                       

                                                                                                  

                                                                                                  

ROS:                                                                                              

10:45 Eyes: Negative for injury, pain, redness, and discharge, Neck: Negative for injury,     jr8 

      pain, and swelling, Cardiovascular: Negative for chest pain, palpitations, and edema,       

      Respiratory: Negative for shortness of breath, cough, wheezing, and pleuritic chest         

      pain, Abdomen/GI: Negative for abdominal pain, nausea, vomiting, diarrhea, and              

      constipation, Back: Negative for injury and pain, MS/Extremity: Negative for injury and     

      deformity, Skin: Negative for injury, rash, and discoloration, Neuro: Negative for          

      headache, weakness, numbness, tingling, and seizure.                                        

10:45 ENT: Positive for difficulty handling secretions, sore throat.                              

                                                                                                  

Exam:                                                                                             

10:45 Constitutional:  This is a well developed, well nourished patient who is awake, alert,  jr8 

      and in no acute distress. Eyes:  Pupils equal round and reactive to light, extra-ocular     

      motions intact.  Lids and lashes normal.  Conjunctiva and sclera are non-icteric and        

      not injected.  Cornea within normal limits.  Periorbital areas with no swelling,            

      redness, or edema. Neck:  Trachea midline, no thyromegaly or masses palpated. Anterior      

      cervical lymphadenopathy present on right side.  Supple, full range of motion without       

      nuchal rigidity, or vertebral point tenderness.  No Meningismus. Cardiovascular:            

      Regular rate and rhythm with a normal S1 and S2.  No gallops, murmurs, or rubs.  Normal     

      PMI, no JVD.  No pulse deficits. Respiratory:  Lungs have equal breath sounds               

      bilaterally, clear to auscultation and percussion.  No rales, rhonchi or wheezes noted.     

       No increased work of breathing, no retractions or nasal flaring. Abdomen/GI:  Soft,        

      non-tender, with normal bowel sounds.  No distension or tympany.  No guarding or            

      rebound.  No evidence of tenderness throughout. Skin:  Warm, dry with normal turgor.        

      Normal color with no rashes, no lesions, and no evidence of cellulitis. MS/ Extremity:      

      Pulses equal, no cyanosis.  Neurovascular intact.  Full, normal range of motion. Neuro:     

       Awake and alert, GCS 15, oriented to person, place, time, and situation.  Motor            

      strength 5/5 in all extremities.  Sensory grossly intact.                                   

10:49 ENT: Exam is negative for ear discharge, TM abnormalities, Mouth: Lips: moist, Oral     jr8 

      mucosa: pink and intact, moist, Gums: pink, Tongue: is moist, Posterior pharynx:            

      Airway: no evidence of obstruction, patent, Tonsils: bilaterally enlarged, with             

      erythema, with exudate, no ulcerations, Uvula: midline, edematous, swelling, is not         

      appreciated, erythema, that is moderate.                                                    

                                                                                                  

Vital Signs:                                                                                      

10:36  / 68; Pulse 100; Resp 18 S; Temp 98.0(TE); Pulse Ox 99% on R/A; Weight 81.65 kg  jd3 

      (R); Height 5 ft. 2 in. (157.48 cm) (R); Pain 10/10;                                        

11:20  / 61; Pulse 88; Resp 16; Pulse Ox 100% on R/A;                                   vg1 

10:36 Body Mass Index 32.92 (81.65 kg, 157.48 cm)                                             jd3 

                                                                                                  

MDM:                                                                                              

10:33 Patient medically screened.                                                             jr8 

10:45 Data reviewed: vital signs, nurses notes, lab test result(s). Data interpreted: Pulse   jr8 

      oximetry: on room air is 99 %. Interpretation: normal. Counseling: I had a detailed         

      discussion with the patient and/or guardian regarding: the historical points, exam          

      findings, and any diagnostic results supporting the discharge/admit diagnosis, lab          

      results, the need for outpatient follow up, a family practitioner, to return to the         

      emergency department if symptoms worsen or persist or if there are any questions or         

      concerns that arise at home.                                                                

11:51 ED course: Strep negative from yesterday. Normal floral growth noted on culture. Mono   jr8 

      negative today. Will have patient continue Augmentin for now and will start steroids as     

      well. Patient knows to come back if worse.                                                  

                                                                                                  

                                                                                             

10:38 Order name: Mono Screen Profile; Complete Time: 11:44                                   jr8 

                                                                                                  

Administered Medications:                                                                         

10:40 Drug: Decadron (dexamethasone) 10 mg Route: IM; Site: right deltoid;                    jd3 

12:15 Follow up: Response: No adverse reaction                                                vg1 

10:55 Drug: Zofran (Ondansetron) 4 mg Route: PO;                                              jd3 

12:15 Follow up: Response: No adverse reaction                                                vg1 

                                                                                                  

                                                                                                  

Disposition:                                                                                      

18:51 Co-signature as Attending Physician, Pedro Fay MD I agree with the assessment and   kdr 

      plan of care.                                                                               

                                                                                                  

Disposition:                                                                                      

21 11:52 Discharged to Home. Impression: Acute tonsillitis.                                 

- Condition is Stable.                                                                            

- Discharge Instructions: Tonsillitis.                                                            

- Prescriptions for Medrol (Mike) 4 mg Oral Tablets, Dose Pack - take 1 tablet by ORAL             

  route as directed - follow package instructions; 1 packet. Zofran 4 mg Oral Tablet -            

  take 1 tablet by ORAL route every 12 hours As needed; 20 tablet.                                

- Medication Reconciliation Form, Thank You Letter, Antibiotic Education, Prescription            

  Opioid Use, Work release form form.                                                             

- Follow up: Alaina Emmanuel MD; When: 1 week; Reason: Recheck today's complaints,            

  Continuance of care, Re-evaluation by your physician.                                           

- Problem is new.                                                                                 

- Symptoms have improved.                                                                         

                                                                                                  

                                                                                                  

                                                                                                  

Signatures:                                                                                       

Dispatcher [employer]                           EDMS                                                 

Pedro Fay MD MD   [facility]                                                  

Gurjit Carroll PA PA   jr8                                                  

Dion Benson RN                    RN   jd3                                                  

Nely Corcoran RN                    RN   vg1                                                  

                                                                                                  

Corrections: (The following items were deleted from the chart)                                    

10:50 10:45 Constitutional: This is a well developed, well nourished patient who is awake,    jr8 

      alert, and in no acute distress. Eyes: Pupils equal round and reactive to light,            

      extra-ocular motions intact. Lids and lashes normal. Conjunctiva and sclera are             

      non-icteric and not injected. Cornea within normal limits. Periorbital areas with no        

      swelling, redness, or edema. Neck: Trachea midline, no thyromegaly or masses palpated.      

      Anterior cervical lymphadenopathy present on right side. Supple, full range of motion       

      without nuchal rigidity, or vertebral point tenderness. No Meningismus. Cardiovascular:     

      Regular rate and rhythm with a normal S1 and S2. No gallops, murmurs, or rubs. Normal       

      PMI, no JVD. No pulse deficits. Respiratory: Lungs have equal breath sounds                 

      bilaterally, clear to auscultation and percussion. No rales, rhonchi or wheezes noted.      

      No increased work of breathing, no retractions or nasal flaring. Abdomen/GI: Soft,          

      non-tender, with normal bowel sounds. No distension or tympany. No guarding or rebound.     

      No evidence of tenderness throughout. Skin: Warm, dry with normal turgor. Normal color      

      with no rashes, no lesions, and no evidence of cellulitis. MS/ Extremity: Pulses equal,     

      no cyanosis. Neurovascular intact. Full, normal range of motion. Neuro: Awake and           

      alert, GCS 15, oriented to person, place, time, and situation. Motor strength 5/5 in        

      all extremities. Sensory grossly intact. jr8                                                

12:16 11:52 2021 11:52 Discharged to Home. Impression: Acute tonsillitis. Condition is  vg1 

      Stable. Forms are Medication Reconciliation Form, Thank You Letter, Antibiotic              

      Education, Prescription Opioid Use. Follow up: Alaina Emmanuel; When: 1 week; Reason:     

      Recheck today's complaints, Continuance of care, Re-evaluation by your physician.           

      Problem is new. Symptoms have improved. jr8                                                 

                                                                                                  

**************************************************************************************************

## 2021-06-04 NOTE — XMS REPORT
Continuity of Care Document

                             Created on:2021



Patient:ROGER REZA

Sex:Female

:1987

External Reference #:[id number]





Demographics







                          Address                   [address]



                                                    [place] [address]

 

                          Home Phone                [phone]

 

                          Work Phone                [phone]

 

                          Mobile Phone              [phone]

 

                          Email Address             [email or web address]

[email or web address]

 

                          Preferred Language        en

 

                          Marital Status            Unknown

 

                          Worship Affiliation     Unknown

 

                          Race                      Unknown

 

                          Additional Race(s)        White



                                                    Unavailable

 

                          Ethnic Group               or 









Author







                          Organization              [facility]

t

 

                          Address                   [address]



                                                    [place] [address]

 

                          Phone                     [phone]









Support







                Name            Relationship    Address         Phone

 

                Lv       Mother          [address]    [phone]



                                                [place] [address] 

 

                Blank        Spouse          [phone]      [phone]



                                                [place] [address] 

 

                Robert        Spouse          Unavailable     [phone]

 

                Robert        Unavailable     [address] [phone]

[phone]



                                                [place] [address] 

 

                Robert        Unavailable     [address] [phone]



                                                [place] [address] 









Care Team Providers







                    Name                Role                Phone

 

                    REYES Catalan       Attending Clinician [phone]

 

                    Provider,  Urgent Care Attending Clinician Unavailable

 

                    Lab,  [facility]am [facility] I     Attending Clinician Unavailable

 

                    Greg Arreola DO   Attending Clinician [phone]

 

                    Singer COSME           Attending Clinician [phone]

 

                    Doctor Unassigned,  Name Attending Clinician Unavailable

 

                    BLADIMIR Serrato     Attending Clinician [phone]

 

                    Only,  Test         Attending Clinician Unavailable









Problems

This patient has no known problems.



Allergies, Adverse Reactions, Alerts







       Allergy Allergy Status Severity Reaction(s) Onset  Inactive Treating Comm

ents 

Source



       Name   Type                        Date   Date   Clinician        

 

       Topamax Adverse Active        mouth break                             [facility]



              Reaction               out                                [facility]



                                                                      [facility]



                                                                      [facility]



                                                                      [facility]

 

       tramadol Adverse Active        more                               [facility]



              Reaction               anxious/no                             [facility]

[facility] -



                                   sleep                              [facility]



                                                                      l



                                                                      [facility]



                                                                      ent



                                                                      Clinics

 

       Sudafed Adverse Active        makes heart                             [facility]



              Reaction               race                               [facility]



                                                                      [facility]



                                                                      [facility]



                                                                      ent



                                                                      Clinics







Medications







       Ordered Filled Start  Stop   Current Ordering Indication Dosage Frequency

 Signature

                    Comments            Components          Source



     Medication Medication Date Date Medication? Clinician                (SIG) 

          



     Name Name                                                   

 

     Amphetamine Amphetamine 2020      Yes  Lio                1 tablet    

       [facility]



     -Dextroamph -Dextroamph 0-12           [place]                               

[facility]



     etamine etamine 00:00:                                              [facility]



               00                                                l



                                                                 [facility]



                                                                 ent



                                                                 Clinics

 

     Adderall Adderall       Yes  Lio                1 tablet          

 [facility]



               9-15           [place]                in the           [facility]



               00:00:                               morning           [facility]



               00                                                l



                                                                 [facility]



                                                                 ent



                                                                 Clinics

 

     HydrOXYzine HydrOXYzine       Yes  Lio                1 capsule   

        [facility]



     Pamoate Pamoate 9-15           [place]                as needed           [facility]

es -



               00:00:                                              [facility]



               00                                                l



                                                                 [facility]



                                                                 ent



                                                                 Clinics

 

     Gabapentin Gabapentin       Yes  Lio                1 capsule     

      [facility]



               3-17           [place]                once           [facility]



               00[facility]00:                               today,           [facility]



               00                                 then 1           l



                                                  capsule           [facility]



                                                  twice           ent



                                                  daily           Clinics

 

     Valacyclovi Valacyclovi       Yes  Lio                1 tablet    

       [facility]



     r HCl r HCl 3-13           [place]                               [facility]



               00[facility]00:                                              [facility]



               00                                                l



                                                                 [facility]



                                                                 ent



                                                                 Clinics

 

     Levothyroxi Levothyroxi           Yes  Lio                1 tablet      

     [facility]



     ne Sodium ne Sodium                [place]                on an           [facility]

es -



                                                  empty           [facility]



                                                  stomach in           l



                                                  the            [facility]



                                                  morning           ent



                                                                 Clinics

 

     Propranolol Propranolol           Yes  Lio                1 tablet      

     [facility]



     HCl  HCl                 [place]                on an           [facility]



                                                  empty           [facility]



                                                  stomach           l



                                                                 [facility]



                                                                 ent



                                                                 Clinics

 

     Duexis Duexis           Yes  [facility]                1 tablet           [facility]

t



                              [place]                               [facility]



                                                                 [facility]



                                                                 l



                                                                 [facility]



                                                                 ent



                                                                 Clinics

 

     Belsomra Belsomra           Yes  L[place]                1 tablet           C

[facility]



                              [place]                at bedtime           [facility]



                                                  as needed           [facility]



                                                                 l



                                                                 [facility]



                                                                 ent



                                                                 Clinics

 

     Zantac Zantac           Yes  [facility]                1 tablet           [facility]

t



                              [place]                at bedtime           [facility]



                                                                 [facility]



                                                                 l



                                                                 [facility]



                                                                 ent



                                                                 Clinics

 

     Cetirizine Cetirizine           Yes  Lio                1 tablet        

   [facility]



     HCl  HCl                 [place]                               [facility]



                                                                 [facility]



                                                                 l



                                                                 [facility]



                                                                 ent



                                                                 Clinics

 

     Escitalopra Escitalopra           Yes  [facility]io                1 tablet      

     [facility]



     m Oxalate m Oxalate                [place]                               [facility]

s -



                                                                 [facility]



                                                                 l



                                                                 [facility]



                                                                 ent



                                                                 Clinics







Immunizations







           Ordered    Filled Immunization Date       Status     Comments   [facility]

e



           Immunization Name Name                                        

 

           TDAP- Boostrix TDAP- Boostrix 2019 Completed             [facility]

 [facility]



                                 00:00:00                         [facility]







Procedures

This patient has no known procedures.



Encounters







        Start   End     Encounter Admission Attending Care    Care    Encounter 

Source



        Date/Time Date/Time Type    Type    Clinicians Facility Department ID   

   

 

        2021 Outpatient                 [facility]  [id number]

 [facility]



        [facility]00                                                 [facility]



                                                                        [facility]



                                                                        l



                                                                        [facility]



                                                                        ent



                                                                        Clinics

 

        2021 Refill          [facility]    [id number] [id number]

[id number] 



        00:00:00 00:00:00                 [facility]  [id number]         



                                                [place] [id number]         



                                                [facility] [phone]         



                                                nal     044             



                                                Office                  



                                                Building                 



                                                One                     

 

        2021 Urgent          Provider, [facility]    [id number]

[id number] 



        15:32:06 15:52:06 Care            [facility]  350.1.13.10        

 



                                        Care    [place] [id number]         



                                                [facility] [phone]         



                                                nal     [facility]             



                                                Office                  



                                                Building                 



                                                One                     

 

        2021 Outpatient                 [facility]  [id number]

 [facility]



        00[facility]00[facility]00                                                 [facility]



                                                                        [facility]



                                                                        l



                                                                        Outpati



                                                                        ent



                                                                        Clinics

 

        2021 Laboratory         Lab, [facility]    [id number]

[id number] 



        11:15:55 11:35:55 Only            [facility]  350.1.13.10         



                                                [place] [id number]         



                                                [facility] [phone]         



                                                nal     [facility]             



                                                Office                  



                                                Building                 



                                                One                     

 

        2021 Patient         [place],  [facility]    [id number]

[id number] 



        00:00:00 00:00:00 Outreach         Amado  PRIMARY 350.1.13.10         



                                        Greg  CARE    [id number]         



                                                PAVILLION [phone]         



                                                        388             

 

        2021 [facility]                 [facility]  [id number]

 [facility]



        00[facility]00[facility]00                                                 [facility]



                                                                        [facility]



                                                                        l



                                                                        Outpati



                                                                        ent



                                                                        Clinics

 

        2021 Outpatient                 [facility]  [id number]

 [facility]



        00[facility]00[facility]:00:00                                                 [facility]



                                                                        [facility]



                                                                        l



                                                                        Outpati



                                                                        ent



                                                                        Clinics

 

        2021-02-10 2021-02-10 Outpatient                 [facility]  [id number]

 [facility]



        00[facility]00[facility]00                                                 [facility]



                                                                        [facility]



                                                                        l



                                                                        Outpati



                                                                        ent



                                                                        Clinics

 

        2021-02-10 2021-02-10 Outpatient                 [facility]  [facility]  [id number]

 [facility]



        00:00[facility]00:00                                                 [facility]



                                                                        [facility]



                                                                        l



                                                                        Outpati



                                                                        ent



                                                                        Clinics

 

        2021 Outpatient                 [facility]  [facility]  [id number]

 [facility]



        00:00[facility]00 00:00:00                                                 [facility]



                                                                        [facility]



                                                                        l



                                                                        Outpati



                                                                        ent



                                                                        Clinics

 

        2021 Outpatient                 [facility]  [facility]  [id number]

 [facility]



        00:00[facility]00 [facility]00:00                                                 [facility]



                                                                        [facility]



                                                                        l



                                                                        Outpati



                                                                        ent



                                                                        Clinics

 

        2020 Outpatient                 [facility]  [id number]

 [facility]



        00:00[facility]00[facility]00                                                 [facility]



                                                                        [facility]



                                                                        l



                                                                        Outpati



                                                                        ent



                                                                        Clinics

 

        2020 [facility]                 [facility]  [id number]

 [facility]



        00:00[facility]00[facility]00                                                 [facility]



                                                                        [facility]



                                                                        l



                                                                        Outpati



                                                                        ent



                                                                        Clinics

 

        2020 Outpatient                 [facility]  [facility]  [id number]

 [facility]



        00:00[facility]00:00                                                 [facility]



                                                                        [facility]



                                                                        l



                                                                        Outpati



                                                                        ent



                                                                        Clinics

 

        2020 Emergency         Singer, [facility]    [id number]

[id number] 



        17:19:00 17:57:00                 To[place] 350.1.13.10         



                                                [place] [id number]         



                                                [place]  [phone]         



                                                        084             

 

        2020 Orders          Doctor  KIERAN    [id number]

[id number] 



        00:00:00 00:00:00 Only            Unassigned, JOHN   350.1.13.10       

  



                                        [facility] [id number]         



                                                        [phone]         



                                                        009             

 

        2020 Outpatient                 [facility]  [facility]  [id number]

 [facility]



        00[facility]00[facility]00 00[facility]00:00                                                 [facility]



                                                                        [facility]



                                                                        l



                                                                        Outpati



                                                                        ent



                                                                        Clinics

 

        2020 Laboratory         Lab, [facility]    [id number]

[id number] 



        15:47:38 16:07:38 Only            [facility]  350.1.13.10         



                                                [place] [id number]         



                                                [facility] [phone]         



                                                nal     044             



                                                Office                  



                                                Building                 



                                                One                     

 

        2020 Outpatient                 [facility]  [id number]

 [facility]



        00:00[facility]00 [facility]00[facility]00                                                 [facility]



                                                                        [facility]



                                                                        l



                                                                        Outpati



                                                                        ent



                                                                        Clinics

 

        2020 Outpatient                 [facility]  [facility]  [id number]

 [facility]



        00[facility]00[facility]00 00:00:00                                                 [facility] 

[facility]



                                                                        [facility]



                                                                        l



                                                                        Outpati



                                                                        ent



                                                                        Clinics

 

        2020 [facility]                 [facility]  [facility]  [id number]

 [facility]



        00[facility]00[facility]00 00[facility]00:00                                                 [facility] 

[facility]



                                                                        [facility]



                                                                        l



                                                                        Outpati



                                                                        ent



                                                                        Clinics

 

        2020-09-15 2020-09-15 Outpatient                 [facility]

[id number] [facility]



        14:40:00 14:40:00                         t [employer]

s -



                                                [address]   [facility]



                                                Family  Medicine         l



                                                Medicine                 Outpati



                                                                        ent



                                                                        Clinics

 

        2020 Emergency         [place], [facility]    [id number]

[id number] 



        21:47:00 22:45:00                 Nica Fost[place] [id number]         



                                                Alva [id number]         



                                                [place]  [phone]         



                                                        084             

 

        2020 2020-07-10 Laboratory         Only, Dic [facility]    [id number]

[id number] 



        15:11:12 16:50:00 Only            Test    HEALTH  [id number]         



                                                FAMILY  [id number]         



                                                MEDICINE [id number]         



                                                [place] [id number]             



                                                CLINIC                  

 

        2020 Outpatient                 [facility]

[id number] [facility]



        11:15:00 11:15:00                         t [employer]

s -



                                                [address]   [facility]



                                                Family  Medicine         l



                                                Medicine                 Outpati



                                                                        ent



                                                                        Clinics

 

        2020 Outpatient                 [facility]

[id number] [facility]



        14:30:00 14:30:00                         t [employer]   [facility]



                                                Family  Medicine         l



                                                Medicine                 Outpati



                                                                        ent



                                                                        Clinics

 

        2020 Outpatient                 [facility]

[id number] [facility]



        08:18:00 08:18:00                         t [facility]



                                                Medicine                 Outpati



                                                                        ent



                                                                        Clinics

 

        2020 Outpatient                 [facility]

[id number] [facility]



        09:40:00 09:40:00                         t [facility]         l



                                                Medicine                 Outpati



                                                                        ent



                                                                        Clinics

 

        2020 Outpatient                 [facility]

[id number] [facility]



        14:37:00 14:37:00                         t [employer]   [employer]

s -



                                                [address]   [facility]  Medicine         l



                                                Medicine                 Outpati



                                                                        ent



                                                                        Clinics

 

        2020 Outpatient                 [facility]

[id number] [facility]



        15:10:00 15:10:00                         t [employer]   [employer]

s -



                                                [address]   [facility]  Medicine         l



                                                Medicine                 Outpati



                                                                        ent



                                                                        Clinics

 

        2020 Outpatient                 [facility]

[id number] [facility]



        09:56:00 09:56:00                         t [employer]   [employer]

s -



                                                [address]   [facility]



                                                Medicine                 Outpati



                                                                        ent



                                                                        Clinics

 

        2020 Outpatient                 [facility]

[id number] [facility]



        09:15:00 09:15:00                         t [employer]   [employer]

s -



                                                [address]   [facility]  Medicine         l



                                                Medicine                 Outpati



                                                                        ent



                                                                        Clinics

 

        2020 Outpatient                 [facility]

[id number] [facility]



        10:30:00 10:30:00                         t [employer]

s -



                                                [address]   [facility]         l



                                                Medicine                 Outpati



                                                                        ent



                                                                        Clinics

 

        2020 Outpatient                 [facility]

[id number] [facility]



        09:40:00 09:40:00                         t [facility]



                                                Medicine                 Outpati



                                                                        ent



                                                                        Clinics

 

        2019 Outpatient                 [facility]

[id number] [facility]



        08:00:00 08:00:00                         t [employer]   [employer]

s -



                                                [address]   [facility]         l



                                                Medicine                 Outpati



                                                                        ent



                                                                        Clinics

 

        2019 Outpatient                 [facility]

[id number] [facility]



        11:45:00 11:45:00                         t [employer]   [employer]

s -



                                                [address]   [facility]  Medicine         l



                                                Medicine                 Outpati



                                                                        ent



                                                                        Clinics

 

        2019 Outpatient                 [facility]

[id number] [facility]



        08:03:00 08:03:00                         t [place]   [address]         [facility]

s -



                                                Drive   [facility]  Medicine         l



                                                Medicine                 Outpati



                                                                        ent



                                                                        Clinics

 

        2019-10-08 2019-10-08 Outpatient                 [facility]

[id number] [facility]



        10:49:00 10:49:00                         t [place]   [address]         [facility]

s -



                                                Drive   [facility]  Medicine         l



                                                Medicine                 Outpati



                                                                        ent



                                                                        Clinics

 

        2019-10-02 2019-10-02 Outpatient                 [facility]

[id number] [facility]



        10:56:00 10:56:00                         t [place]   [address]         [facility]

s -



                                                Drive   [facility]  Medicine         l



                                                Medicine                 Outpati



                                                                        ent



                                                                        Clinics

 

        2019-10-01 2019-10-01 Outpatient                 [facility]

[id number] [facility]



        16:12:00 16:12:00                         t [place]   [address]         [facility]

s -



                                                Drive   [facility]  Medicine         l



                                                Medicine                 Outpati



                                                                        ent



                                                                        Clinics

 

        2019 Outpatient                 [facility]

[id number] [facility]



        09:00:00 09:00:00                         t [place]   [address]         [facility]

s -



                                                Drive   [facility]  Medicine         l



                                                Medicine                 Outpati



                                                                        ent



                                                                        Clinics

 

        2019 Outpatient                 [facility]

[id number] [facility]



        14:18:00 14:18:00                         t [place]   [address]         [facility]

s -



                                                Drive   [facility]  Medicine         l



                                                Medicine                 Outpati



                                                                        ent



                                                                        Clinics

 

        2019 Outpatient                 [facility]

[id number] [facility]



        08:30:00 08:30:00                         t [place]   [address]         [facility]

s -



                                                Drive   [facility]  Medicine         l



                                                Medicine                 Outpati



                                                                        ent



                                                                        Clinics

 

        2019 Outpatient                 [facility]

[id number] [facility]



        10:38:00 10:38:00                         t [place]   [address]         [facility]

s -



                                                Drive   [facility]  Medicine         l



                                                Medicine                 Outpati



                                                                        ent



                                                                        Clinics

 

        2019 Outpatient                 [facility]

[id number] [facility]



        08:15:00 08:15:00                         t [place]   [address]         [facility]

s -



                                                Drive   [facility]  Medicine         l



                                                Medicine                 Outpati



                                                                        ent



                                                                        Clinics

 

        2019 Outpatient                 [facility]

[id number] [facility]



        08:45:00 08:45:00                         t [place]   [address]         [facility]

s -



                                                Drive   [facility]  Medicine         l



                                                Medicine                 Outpati



                                                                        ent



                                                                        Clinics

 

        2019 Outpatient                 [facility]

[id number] [facility]



        08:15:00 08:15:00                         t [place]   [address]         [facility]

s -



                                                Drive   [facility]  Medicine         l



                                                Medicine                 Outpati



                                                                        ent



                                                                        Clinics

 

        2018-10-03 2018-10-03 Outpatient                 [facility]

[id number] [facility]



        08:30:00 08:30:00                         t [place]   [address]         [facility]

s -



                                                Drive   [facility]



                                                Medicine                 Outpati



                                                                        ent



                                                                        Clinics

 

        2018 Outpatient                 [facility]

[id number] [facility]



        15:30:00 15:30:00                         t [place]   [address]         [facility]

s -



                                                Drive   [facility]  Medicine         l



                                                Medicine                 Outpati



                                                                        ent



                                                                        Clinics

 

        2018 Outpatient                 [facility]

[id number] [facility]



        09:21:00 09:21:00                         t [place]   [address]         [facility]

s -



                                                Drive   [facility]



                                                Medicine                 Outpati



                                                                        ent



                                                                        Clinics

 

        2018 Outpatient                 [facility]

[id number] [facility]



        14:07:00 14:07:00                         t [place]   [address]         [facility]

s -



                                                Drive   [facility]  Medicine         l



                                                Medicine                 Outpati



                                                                        ent



                                                                        Clinics

 

        2018 Outpatient                 [facility]

[id number] [facility]



        13:30:00 13:30:00                         t [place]   [address]         [facility]

s -



                                                Drive   [facility]



                                                Medicine                 Outpati



                                                                        ent



                                                                        Clinics

 

        2018 Outpatient                 [facility]

[id number] [facility]



        15:45:00 15:45:00                         t [place]   [address]         [facility]

s -



                                                Drive   [facility]  Medicine         l



                                                Medicine                 Outpati



                                                                        ent



                                                                        Clinics

 

        2018 Outpatient                 [facility]

[id number] [facility]



        08:27:00 08:27:00                         [facility]         [facility]

[facility] -



                                                Care    [facility]         [facility]

[facility]



                                                Clinic                  l



                                                                        Outpati



                                                                        ent



                                                                        Clinics

 

        2018 Outpatient                 [facility]

[id number] [facility]



        15:35:00 15:35:00                         [facility]         [facility]

[facility] -



                                                Care    [facility]         [facility]

[facility]



                                                Clinic                  l



                                                                        Outpati



                                                                        ent



                                                                        Clinics

 

        2018 Outpatient                 [facility]

[id number] [facility]



        15:34:00 15:34:00                         [facility]         [facility]

[facility] -



                                                Care    [facility]         [facility]

[facility]



                                                Clinic                  l



                                                                        Outpati



                                                                        ent



                                                                        Clinics

 

        2018 Outpatient                 [facility]

[id number] [facility]



        15:15:00 15:15:00                         t [facility]         [facility]

s -



                                                Care    Care Clinic         [facility]

[facility]



                                                Clinic                  l



                                                                        Outpati



                                                                        ent



                                                                        Clinics

 

        2018 Outpatient                 [facility]

[id number] [facility]



        15:45:00 15:45:00                         t [place]   [address]         [facility]

s -



                                                Drive   [facility]



                                                Family  Medicine         l



                                                Medicine                 Outpati



                                                                        ent



                                                                        Clinics

 

        2018 Outpatient                 [facility]

[id number] [facility]



        10:09:00 10:09:00                         t [place]   [address]         [facility]

s -



                                                Drive   [facility]  Medicine         l



                                                Medicine                 Outpati



                                                                        ent



                                                                        Clinics

 

        2018 Outpatient                 [facility]

[id number] [facility]



        13:00:00 13:00:00                         t [place]   [address]         [facility]

s -



                                                Drive   [facility]  Medicine         l



                                                Medicine                 Outpati



                                                                        ent



                                                                        Clinics

 

        2018 Outpatient                 [facility]

[id number] [facility]



        15:04:00 15:04:00                         t [place]   [address]         [facility]

s -



                                                Drive   [facility]



                                                Family  Medicine         l



                                                Medicine                 Outpati



                                                                        ent



                                                                        Clinics

 

        2018 Outpatient                 [facility]

[id number] [facility]



        13:54:00 13:54:00                         t [place]   [address]         [facility]

s -



                                                Drive   [facility]  Medicine         l



                                                Medicine                 Outpati



                                                                        ent



                                                                        Clinics

 

        2018 Outpatient                 [facility]

[id number] [facility]



        11:11:00 11:11:00                         t [place]   [address]         [facility]

s -



                                                Drive   [facility]  Medicine         l



                                                Medicine                 Outpati



                                                                        ent



                                                                        Clinics

 

        2018-04-10 2018-04-10 Outpatient                 [facility]

[id number] [facility]



        08:52:00 08:52:00                         t [facility]   [address]         [facility]

s -



                                                Drive   [facility]  Medicine         l



                                                Medicine                 Outpati



                                                                        ent



                                                                        Clinics

 

        2018 Outpatient                 [facility]

[id number] [facility]



        13:00:00 13:00:00                         t [place]   [address]         [facility]

s -



                                                Drive   [facility]  Medicine         l



                                                Medicine                 Outpati



                                                                        ent



                                                                        Clinics







Results

This patient has no known results.

## 2021-06-04 NOTE — ER
Nurse's Notes                                                                                     

 [facility]\""                                                                 

Name: Lesley Jones                                                                             

Age: 33 yrs                                                                                       

Sex: Female                                                                                       

: 1987                                                                                   

MRN: [id number]                                                                                   

Arrival Date: 2021                                                                          

Time: 10:18                                                                                       

Account#: [id number]                                                                            

Bed 10                                                                                            

Private MD:                                                                                       

Diagnosis: Acute tonsillitis                                                                      

                                                                                                  

Presentation:                                                                                     

                                                                                             

10:33 Chief complaint: Patient states: "I have been having throat pain and discomfort for     jd3 

      awhile now, I was seen here and was given antibiotics, but the antibiotics are not          

      helping and it hurting so much now.". Coronavirus screen: At this time, the client does     

      not indicate any symptoms associated with coronavirus-19. Ebola Screen: Patient             

      negative for fever greater than or equal to 101.5 degrees Fahrenheit, and additional        

      compatible Ebola Virus Disease symptoms. Initial Sepsis Screen: Does the patient meet       

      any 2 criteria? No. Patient's initial sepsis screen is negative. Does the patient have      

      a suspected source of infection? No. Patient's initial sepsis screen is negative. Risk      

      Assessment: Do you want to hurt yourself or someone else? Patient reports no desire to      

      harm self or others. Onset of symptoms was 2021.                                   

10:33 Method Of Arrival: Ambulatory                                                           jd3 

10:33 Acuity: KAYLA 3                                                                           jd3 

                                                                                                  

Triage Assessment:                                                                                

10:52 General: Appears in no apparent distress. uncomfortable, Behavior is calm, cooperative, jd3 

      appropriate for age, crying. Pain: Complains of pain in throat Quality of pain is           

      described as sharp, squeezing, stinging. EENT: Throat is reddened has patchy exudate        

      has enlarged tonsils bilaterally. Neuro: Level of Consciousness is awake, alert, obeys      

      commands, Oriented to person, place, time, situation. Cardiovascular: Capillary refill      

      < 3 seconds Patient's skin is warm and dry. Respiratory: Airway is patent Respiratory       

      effort is even, unlabored, Respiratory pattern is regular, symmetrical, Denies cough,       

      shortness of breath. GI: Reports nausea. : No signs and/or symptoms were reported         

      regarding the genitourinary system. Derm: Skin is intact, Skin is dry, Skin is normal,      

      Skin temperature is warm. Musculoskeletal: No signs and/or symptoms reported regarding      

      the musculoskeletal system.                                                                 

                                                                                                  

OB/GYN:                                                                                           

10:36 LMP N/A - Irregular menses                                                              jd3 

                                                                                                  

Historical:                                                                                       

- Allergies:                                                                                      

10:36 Sudafed (RAPID HEART RATE);                                                             jd3 

10:36 Tramadol HCl (Unable to sleep);                                                         jd3 

- PMHx:                                                                                           

10:36 ADD/ADHD; Anxiety; Depression; ESSENTIAL TREMORS; Hypothyroidism;                       jd3 

- PSHx:                                                                                           

10:36 ; Cholecystectomy; lap band;                                                   jd3 

                                                                                                  

- Immunization history:: Adult Immunizations up to date.                                          

- Social history:: Smoking status: unknown.                                                       

                                                                                                  

                                                                                                  

Screening:                                                                                        

10:53 Abuse screen: Denies threats or abuse. Nutritional screening: No deficits noted.        jd3 

      Tuberculosis screening: No symptoms or risk factors identified. Fall Risk None              

      identified.                                                                                 

                                                                                                  

Assessment:                                                                                       

11:11 General: Appears in no apparent distress. comfortable, Behavior is calm, cooperative.   vg1 

      Pain: Complains of pain in throat Pain currently is 10 out of 10 on a pain scale.           

      Quality of pain is described as sharp, piercing, Pain began 2-3 days ago. Noted to be       

      grimacing. Neuro: Level of Consciousness is awake, alert, obeys commands, Oriented to       

      person, place, time, situation. Cardiovascular: Patient's skin is warm and dry.             

      Respiratory: Airway is patent Respiratory effort is even, unlabored, Denies shortness       

      of breath. GI: Reports nausea, vomiting. : No signs and/or symptoms were reported         

      regarding the genitourinary system. EENT: Throat is reddened has enlarged tonsils           

      Reports difficulty swallowing. Derm: Skin is intact, is healthy with good turgor.           

      Musculoskeletal: Circulation, motion, and sensation intact.                                 

                                                                                                  

Vital Signs:                                                                                      

10:36  / 68; Pulse 100; Resp 18 S; Temp 98.0(TE); Pulse Ox 99% on R/A; Weight 81.65 kg  jd3 

      (R); Height 5 ft. 2 in. (157.48 cm) (R); Pain 10/10;                                        

11:20  / 61; Pulse 88; Resp 16; Pulse Ox 100% on R/A;                                   vg1 

10:36 Body Mass Index 32.92 (81.65 kg, 157.48 cm)                                             jd3 

                                                                                                  

ED Course:                                                                                        

10:18 Patient arrived in ED.                                                                  ds1 

10:33 Gurjit Carroll PA is PHCP.                                                               jr8 

10:33 Pedro Fay MD is Attending Physician.                                              jr8 

10:35 Triage completed.                                                                       jd3 

10:37 Arm band placed on.                                                                     jd3 

10:51 Mono Screen Profile Sent.                                                               jd3 

10:53 Patient has correct armband on for positive identification. Bed in low position. Side   jd3 

      rails up X 1. Pulse ox on. NIBP on.                                                         

11:18 Nely Corcoran, RN is Primary Nurse.                                                  vg1 

11:52 Alaina Emmanuel MD is Referral Physician.                                           jr8 

12:15 No provider procedures requiring assistance completed. Patient did not have IV access   vg1 

      during this emergency room visit.                                                           

                                                                                                  

Administered Medications:                                                                         

10:40 Drug: Decadron (dexamethasone) 10 mg Route: IM; Site: right deltoid;                    jd3 

12:15 Follow up: Response: No adverse reaction                                                vg1 

10:55 Drug: Zofran (Ondansetron) 4 mg Route: PO;                                              jd3 

12:15 Follow up: Response: No adverse reaction                                                vg1 

                                                                                                  

                                                                                                  

Outcome:                                                                                          

11:52 Discharge ordered by MD.                                                                jr8 

12:15 Discharged to home ambulatory.                                                          vg1 

12:15 Condition: stable                                                                           

12:15 Discharge instructions given to patient, Instructed on discharge instructions, follow       

      up and referral plans. medication usage, Demonstrated understanding of instructions,        

      follow-up care, medications, Prescriptions given X 2.                                       

12:16 Patient left the ED.                                                                    vg1 

                                                                                                  

Signatures:                                                                                       

Malika Cagle                                ds1                                                  

Gurjit Carroll PA                        PA   jr8                                                  

Dion Benson RN                    RN   jNely Elam, RN                    RN   vg1                                                  

                                                                                                  

Corrections: (The following items were deleted from the chart)                                    

10:38 10:33 Acuity: KAYLA 4 jd3                                                                 jd3 

                                                                                                  

**************************************************************************************************